# Patient Record
Sex: FEMALE | Race: WHITE | Employment: OTHER | URBAN - METROPOLITAN AREA
[De-identification: names, ages, dates, MRNs, and addresses within clinical notes are randomized per-mention and may not be internally consistent; named-entity substitution may affect disease eponyms.]

---

## 2019-05-10 ENCOUNTER — HOSPITAL ENCOUNTER (OUTPATIENT)
Age: 74
Setting detail: SPECIMEN
Discharge: HOME | End: 2019-05-10
Payer: MEDICARE

## 2019-05-10 LAB
-: ABNORMAL
ABSOLUTE EOS #: 0.26 K/UL (ref 0–0.44)
ABSOLUTE IMMATURE GRANULOCYTE: <0.03 K/UL (ref 0–0.3)
ABSOLUTE LYMPH #: 1.91 K/UL (ref 1.1–3.7)
ABSOLUTE MONO #: 0.49 K/UL (ref 0.1–1.2)
ALBUMIN SERPL-MCNC: 4.5 G/DL (ref 3.5–5.2)
ALBUMIN/GLOBULIN RATIO: 2.1 (ref 1–2.5)
ALP BLD-CCNC: 86 U/L (ref 35–104)
ALT SERPL-CCNC: 13 U/L (ref 5–33)
AMORPHOUS: ABNORMAL
ANION GAP SERPL CALCULATED.3IONS-SCNC: 16 MMOL/L (ref 9–17)
AST SERPL-CCNC: 25 U/L
BACTERIA: ABNORMAL
BASOPHILS # BLD: 1 % (ref 0–2)
BASOPHILS ABSOLUTE: 0.05 K/UL (ref 0–0.2)
BILIRUB SERPL-MCNC: 0.71 MG/DL (ref 0.3–1.2)
BILIRUBIN URINE: NEGATIVE
BUN BLDV-MCNC: 11 MG/DL (ref 8–23)
BUN/CREAT BLD: ABNORMAL (ref 9–20)
CALCIUM SERPL-MCNC: 9.8 MG/DL (ref 8.6–10.4)
CASTS UA: ABNORMAL /LPF (ref 0–8)
CHLORIDE BLD-SCNC: 97 MMOL/L (ref 98–107)
CHOLESTEROL/HDL RATIO: 3.1
CHOLESTEROL: 181 MG/DL
CO2: 25 MMOL/L (ref 20–31)
COLOR: YELLOW
COMMENT UA: ABNORMAL
CREAT SERPL-MCNC: 0.6 MG/DL (ref 0.5–0.9)
CRYSTALS, UA: ABNORMAL /HPF
DIFFERENTIAL TYPE: NORMAL
EOSINOPHILS RELATIVE PERCENT: 4 % (ref 1–4)
EPITHELIAL CELLS UA: ABNORMAL /HPF (ref 0–5)
GFR AFRICAN AMERICAN: >60 ML/MIN
GFR NON-AFRICAN AMERICAN: >60 ML/MIN
GFR SERPL CREATININE-BSD FRML MDRD: ABNORMAL ML/MIN/{1.73_M2}
GFR SERPL CREATININE-BSD FRML MDRD: ABNORMAL ML/MIN/{1.73_M2}
GLUCOSE BLD-MCNC: 90 MG/DL (ref 70–99)
GLUCOSE URINE: NEGATIVE
HCT VFR BLD CALC: 43.6 % (ref 36.3–47.1)
HDLC SERPL-MCNC: 59 MG/DL
HEMOGLOBIN: 14 G/DL (ref 11.9–15.1)
IMMATURE GRANULOCYTES: 0 %
KETONES, URINE: NEGATIVE
LDL CHOLESTEROL: 90 MG/DL (ref 0–130)
LEUKOCYTE ESTERASE, URINE: ABNORMAL
LYMPHOCYTES # BLD: 30 % (ref 24–43)
MCH RBC QN AUTO: 30.1 PG (ref 25.2–33.5)
MCHC RBC AUTO-ENTMCNC: 32.1 G/DL (ref 28.4–34.8)
MCV RBC AUTO: 93.8 FL (ref 82.6–102.9)
MONOCYTES # BLD: 8 % (ref 3–12)
MUCUS: ABNORMAL
NITRITE, URINE: NEGATIVE
NRBC AUTOMATED: 0 PER 100 WBC
OTHER OBSERVATIONS UA: ABNORMAL
PDW BLD-RTO: 13.6 % (ref 11.8–14.4)
PH UA: 6 (ref 5–8)
PLATELET # BLD: NORMAL K/UL (ref 138–453)
PLATELET ESTIMATE: NORMAL
PLATELET, FLUORESCENCE: NORMAL K/UL (ref 138–453)
PMV BLD AUTO: NORMAL FL (ref 8.1–13.5)
POTASSIUM SERPL-SCNC: 5.1 MMOL/L (ref 3.7–5.3)
PROTEIN UA: NEGATIVE
RBC # BLD: 4.65 M/UL (ref 3.95–5.11)
RBC # BLD: NORMAL 10*6/UL
RBC UA: ABNORMAL /HPF (ref 0–4)
RENAL EPITHELIAL, UA: ABNORMAL /HPF
SEG NEUTROPHILS: 58 % (ref 36–65)
SEGMENTED NEUTROPHILS ABSOLUTE COUNT: 3.69 K/UL (ref 1.5–8.1)
SODIUM BLD-SCNC: 138 MMOL/L (ref 135–144)
SPECIFIC GRAVITY UA: 1.01 (ref 1–1.03)
TOTAL PROTEIN: 6.6 G/DL (ref 6.4–8.3)
TRICHOMONAS: ABNORMAL
TRIGL SERPL-MCNC: 158 MG/DL
TSH SERPL DL<=0.05 MIU/L-ACNC: 3.7 MIU/L (ref 0.3–5)
TURBIDITY: CLEAR
URINE HGB: NEGATIVE
UROBILINOGEN, URINE: NORMAL
VLDLC SERPL CALC-MCNC: ABNORMAL MG/DL (ref 1–30)
WBC # BLD: 6.4 K/UL (ref 3.5–11.3)
WBC # BLD: NORMAL 10*3/UL
WBC UA: ABNORMAL /HPF (ref 0–5)
YEAST: ABNORMAL

## 2019-05-12 LAB
CULTURE: ABNORMAL
Lab: ABNORMAL
SPECIMEN DESCRIPTION: ABNORMAL

## 2019-06-12 RX ORDER — PYRIDOXINE HCL (VITAMIN B6) 100 MG
50 TABLET ORAL DAILY
COMMUNITY

## 2019-06-12 RX ORDER — ACETAMINOPHEN 325 MG/1
650 TABLET ORAL EVERY 4 HOURS PRN
COMMUNITY

## 2019-06-12 RX ORDER — RIBOFLAVIN (VITAMIN B2) 100 MG
100 TABLET ORAL DAILY
COMMUNITY

## 2019-06-12 RX ORDER — SENNA PLUS 8.6 MG/1
1 TABLET ORAL DAILY
COMMUNITY

## 2019-06-12 RX ORDER — DOCUSATE SODIUM 100 MG/1
100 CAPSULE, LIQUID FILLED ORAL 2 TIMES DAILY
COMMUNITY

## 2019-06-12 RX ORDER — GABAPENTIN 300 MG/1
300 CAPSULE ORAL 2 TIMES DAILY
COMMUNITY

## 2019-06-12 RX ORDER — FLUTICASONE FUROATE AND VILANTEROL 100; 25 UG/1; UG/1
POWDER RESPIRATORY (INHALATION) DAILY
COMMUNITY

## 2019-06-12 RX ORDER — DULOXETIN HYDROCHLORIDE 60 MG/1
60 CAPSULE, DELAYED RELEASE ORAL 2 TIMES DAILY
COMMUNITY

## 2019-06-12 RX ORDER — ALBUTEROL SULFATE 2.5 MG/3ML
2.5 SOLUTION RESPIRATORY (INHALATION) EVERY 6 HOURS PRN
COMMUNITY

## 2019-06-12 RX ORDER — FUROSEMIDE 20 MG/1
20 TABLET ORAL DAILY
COMMUNITY
End: 2019-06-18

## 2019-06-12 RX ORDER — SULFAMETHOXAZOLE AND TRIMETHOPRIM 800; 160 MG/1; MG/1
1 TABLET ORAL 2 TIMES DAILY
COMMUNITY
End: 2019-10-10 | Stop reason: ALTCHOICE

## 2019-06-12 RX ORDER — DILTIAZEM HYDROCHLORIDE 120 MG/1
120 CAPSULE, EXTENDED RELEASE ORAL DAILY
COMMUNITY

## 2019-06-12 RX ORDER — NICOTINE POLACRILEX 2 MG
GUM BUCCAL 3 TIMES DAILY
COMMUNITY
End: 2019-10-10 | Stop reason: ALTCHOICE

## 2019-06-17 RX ORDER — LISINOPRIL 5 MG/1
5 TABLET ORAL DAILY
COMMUNITY
End: 2019-06-18

## 2019-06-17 RX ORDER — SOLIFENACIN SUCCINATE 10 MG/1
10 TABLET, FILM COATED ORAL DAILY
COMMUNITY

## 2019-06-17 RX ORDER — MECLIZINE HYDROCHLORIDE 25 MG/1
25 TABLET ORAL 3 TIMES DAILY PRN
COMMUNITY
End: 2019-10-10 | Stop reason: ALTCHOICE

## 2019-06-17 RX ORDER — ATORVASTATIN CALCIUM 20 MG/1
20 TABLET, FILM COATED ORAL DAILY
COMMUNITY

## 2019-06-17 RX ORDER — MULTIVIT-MIN/FERROUS GLUCONATE 9 MG/15 ML
15 LIQUID (ML) ORAL DAILY
COMMUNITY

## 2019-06-17 RX ORDER — LORATADINE 10 MG/1
10 TABLET ORAL DAILY
COMMUNITY
End: 2019-10-10 | Stop reason: ALTCHOICE

## 2019-06-17 RX ORDER — LEVOTHYROXINE SODIUM 0.05 MG/1
50 TABLET ORAL DAILY
COMMUNITY

## 2019-06-17 RX ORDER — M-VIT,TX,IRON,MINS/CALC/FOLIC 27MG-0.4MG
1 TABLET ORAL DAILY
COMMUNITY
End: 2021-05-26 | Stop reason: SDUPTHER

## 2019-06-17 RX ORDER — LETROZOLE 2.5 MG/1
2.5 TABLET, FILM COATED ORAL DAILY
COMMUNITY
End: 2019-10-10 | Stop reason: ALTCHOICE

## 2019-06-17 RX ORDER — OMEPRAZOLE 20 MG/1
40 CAPSULE, DELAYED RELEASE ORAL DAILY
COMMUNITY

## 2019-06-17 RX ORDER — UBIDECARENONE 75 MG
50 CAPSULE ORAL DAILY
COMMUNITY

## 2019-06-17 RX ORDER — METOPROLOL SUCCINATE 25 MG/1
12.5 TABLET, EXTENDED RELEASE ORAL DAILY
COMMUNITY

## 2019-06-17 RX ORDER — NITROGLYCERIN 0.4 MG/1
0.4 TABLET SUBLINGUAL EVERY 5 MIN PRN
COMMUNITY

## 2019-06-17 RX ORDER — FLUOXETINE HYDROCHLORIDE 20 MG/1
40 CAPSULE ORAL DAILY
COMMUNITY

## 2019-06-17 RX ORDER — POLYETHYLENE GLYCOL 3350 17 G/17G
17 POWDER, FOR SOLUTION ORAL 2 TIMES DAILY
COMMUNITY

## 2019-06-18 ENCOUNTER — OFFICE VISIT (OUTPATIENT)
Dept: NEUROLOGY | Age: 74
End: 2019-06-18
Payer: MEDICARE

## 2019-06-18 VITALS
WEIGHT: 134 LBS | HEIGHT: 59 IN | SYSTOLIC BLOOD PRESSURE: 148 MMHG | DIASTOLIC BLOOD PRESSURE: 90 MMHG | HEART RATE: 74 BPM | BODY MASS INDEX: 27.01 KG/M2

## 2019-06-18 DIAGNOSIS — M19.90 OSTEOARTHRITIS, UNSPECIFIED OSTEOARTHRITIS TYPE, UNSPECIFIED SITE: ICD-10-CM

## 2019-06-18 DIAGNOSIS — R41.3 MEMORY PROBLEM: ICD-10-CM

## 2019-06-18 DIAGNOSIS — F32.A ANXIETY AND DEPRESSION: ICD-10-CM

## 2019-06-18 DIAGNOSIS — R53.82 CHRONIC FATIGUE: ICD-10-CM

## 2019-06-18 DIAGNOSIS — G63 POLYNEUROPATHY ASSOCIATED WITH UNDERLYING DISEASE (HCC): ICD-10-CM

## 2019-06-18 DIAGNOSIS — F41.9 ANXIETY AND DEPRESSION: ICD-10-CM

## 2019-06-18 DIAGNOSIS — G93.89 OTHER SPECIFIED DISORDERS OF BRAIN: ICD-10-CM

## 2019-06-18 DIAGNOSIS — G47.9 DIFFICULTY SLEEPING: ICD-10-CM

## 2019-06-18 DIAGNOSIS — R26.9 GAIT DIFFICULTY: ICD-10-CM

## 2019-06-18 DIAGNOSIS — R42 VERTIGO: ICD-10-CM

## 2019-06-18 DIAGNOSIS — G44.229 CHRONIC TENSION-TYPE HEADACHE, NOT INTRACTABLE: ICD-10-CM

## 2019-06-18 DIAGNOSIS — F34.1 DYSTHYMIC DISORDER: ICD-10-CM

## 2019-06-18 DIAGNOSIS — R25.8 BRADYKINESIA: ICD-10-CM

## 2019-06-18 DIAGNOSIS — R29.6 EXCESSIVE FALLING: ICD-10-CM

## 2019-06-18 DIAGNOSIS — S09.90XA MILD CLOSED HEAD INJURY, INITIAL ENCOUNTER: ICD-10-CM

## 2019-06-18 DIAGNOSIS — E78.5 HYPERLIPIDEMIA, UNSPECIFIED HYPERLIPIDEMIA TYPE: ICD-10-CM

## 2019-06-18 DIAGNOSIS — R42 DIZZINESS: Primary | ICD-10-CM

## 2019-06-18 DIAGNOSIS — K21.9 GASTROESOPHAGEAL REFLUX DISEASE WITHOUT ESOPHAGITIS: ICD-10-CM

## 2019-06-18 DIAGNOSIS — R26.89 BALANCE PROBLEM: ICD-10-CM

## 2019-06-18 DIAGNOSIS — Z91.81 H/O FALLING: ICD-10-CM

## 2019-06-18 DIAGNOSIS — J44.9 CHRONIC OBSTRUCTIVE PULMONARY DISEASE, UNSPECIFIED COPD TYPE (HCC): ICD-10-CM

## 2019-06-18 DIAGNOSIS — G20 PD (PARKINSON'S DISEASE) (HCC): ICD-10-CM

## 2019-06-18 DIAGNOSIS — E03.9 ACQUIRED HYPOTHYROIDISM: ICD-10-CM

## 2019-06-18 DIAGNOSIS — R26.9 ABNORMAL GAIT: ICD-10-CM

## 2019-06-18 PROBLEM — G20.A1 PD (PARKINSON'S DISEASE): Status: ACTIVE | Noted: 2019-06-18

## 2019-06-18 PROCEDURE — 1090F PRES/ABSN URINE INCON ASSESS: CPT | Performed by: PSYCHIATRY & NEUROLOGY

## 2019-06-18 PROCEDURE — 99205 OFFICE O/P NEW HI 60 MIN: CPT | Performed by: PSYCHIATRY & NEUROLOGY

## 2019-06-18 PROCEDURE — 3023F SPIROM DOC REV: CPT | Performed by: PSYCHIATRY & NEUROLOGY

## 2019-06-18 PROCEDURE — G8419 CALC BMI OUT NRM PARAM NOF/U: HCPCS | Performed by: PSYCHIATRY & NEUROLOGY

## 2019-06-18 PROCEDURE — G8926 SPIRO NO PERF OR DOC: HCPCS | Performed by: PSYCHIATRY & NEUROLOGY

## 2019-06-18 PROCEDURE — G8427 DOCREV CUR MEDS BY ELIG CLIN: HCPCS | Performed by: PSYCHIATRY & NEUROLOGY

## 2019-06-18 RX ORDER — HYDROCODONE BITARTRATE AND ACETAMINOPHEN 5; 325 MG/1; MG/1
1 TABLET ORAL EVERY 6 HOURS PRN
COMMUNITY
End: 2019-10-10 | Stop reason: ALTCHOICE

## 2019-06-18 ASSESSMENT — ENCOUNTER SYMPTOMS
PHOTOPHOBIA: 0
BLOOD IN STOOL: 0
EYE PAIN: 0
WHEEZING: 0
SORE THROAT: 0
ABDOMINAL DISTENTION: 0
CHANGE IN BOWEL HABIT: 0
CHOKING: 0
CHEST TIGHTNESS: 0
SINUS PRESSURE: 0
ABDOMINAL PAIN: 0
VOMITING: 0
DIARRHEA: 0
EYE DISCHARGE: 0
BOWEL INCONTINENCE: 0
VOICE CHANGE: 0
NAUSEA: 0
TROUBLE SWALLOWING: 0
VISUAL CHANGE: 0
EYE ITCHING: 0
EYE REDNESS: 0
SWOLLEN GLANDS: 0
COUGH: 0
SHORTNESS OF BREATH: 0
BACK PAIN: 1
FACIAL SWELLING: 0
COLOR CHANGE: 0
APNEA: 0
CONSTIPATION: 0

## 2019-06-18 NOTE — PATIENT INSTRUCTIONS
* FALL   PRECAUTIONS. *  USE   WALKING  ASSISTANCE  DEVICES     QUAD  CANE  /   WALKER        *   ADEQUATE   FLUID  INTAKE   AND  ELECTROLYTE  BALANCE           * KEEP  DAIRY  OF   THE  NEUROLOGICAL  SYMPTOMS          *  TO  MAINTAIN  REGULAR  SLEEP  WAKE  CYCLES. *   TO  HAVE  ADEQUATE  REST  AND   SLEEP    HOURS.        *    AVOID  USAGE OF   TOBACCO,  EXCESSIVE  ALCOHOL                AND   ILLEGAL   SUBSTANCES,  IF  ANY          *  Maintain   Healthy  Life Style    With   Periodic  Monitoring  Of      Any  Medical  Conditions  Including   Elevated  Blood  Pressure,  Lipid  Profile,     Blood  Sugar levels  And   Heart  Disease. *   Period   Screening  For  Cancers  Involving  Breast,  Colon,   lungs  And  Other  Organs  As  Applicable,  In consultation   With  Your  Primary Care Providers. *  If   There is  Any  Significant  Worsening   Of  Current  Symptoms  And  Or  If    Any additional  New  Neurological  Symptoms                 Or  Significant  Concerns   Should  Call  911 or  Go  To  Emergency  Department  For  Further  Immediate  Evaluation.

## 2019-07-03 ENCOUNTER — HOSPITAL ENCOUNTER (OUTPATIENT)
Dept: INTERVENTIONAL RADIOLOGY/VASCULAR | Age: 74
Discharge: HOME | End: 2019-07-05
Payer: MEDICARE

## 2019-07-03 DIAGNOSIS — G44.229 CHRONIC TENSION-TYPE HEADACHE, NOT INTRACTABLE: ICD-10-CM

## 2019-07-03 DIAGNOSIS — R41.3 MEMORY PROBLEM: ICD-10-CM

## 2019-07-03 DIAGNOSIS — R42 VERTIGO: ICD-10-CM

## 2019-07-03 DIAGNOSIS — G47.9 DIFFICULTY SLEEPING: ICD-10-CM

## 2019-07-03 DIAGNOSIS — R29.6 EXCESSIVE FALLING: ICD-10-CM

## 2019-07-03 DIAGNOSIS — G63 POLYNEUROPATHY ASSOCIATED WITH UNDERLYING DISEASE (HCC): ICD-10-CM

## 2019-07-03 DIAGNOSIS — S09.90XA MILD CLOSED HEAD INJURY, INITIAL ENCOUNTER: ICD-10-CM

## 2019-07-03 DIAGNOSIS — Z91.81 H/O FALLING: ICD-10-CM

## 2019-07-03 DIAGNOSIS — R42 DIZZINESS: ICD-10-CM

## 2019-07-03 DIAGNOSIS — G20 PD (PARKINSON'S DISEASE) (HCC): ICD-10-CM

## 2019-07-03 PROCEDURE — 93880 EXTRACRANIAL BILAT STUDY: CPT

## 2019-07-15 DIAGNOSIS — G20 PD (PARKINSON'S DISEASE) (HCC): Primary | ICD-10-CM

## 2019-08-07 ENCOUNTER — HOSPITAL ENCOUNTER (OUTPATIENT)
Dept: NEUROLOGY | Age: 74
Discharge: HOME | End: 2019-08-07
Payer: MEDICARE

## 2019-08-07 DIAGNOSIS — R29.6 EXCESSIVE FALLING: ICD-10-CM

## 2019-08-07 DIAGNOSIS — G47.9 DIFFICULTY SLEEPING: ICD-10-CM

## 2019-08-07 DIAGNOSIS — R42 DIZZINESS: ICD-10-CM

## 2019-08-07 DIAGNOSIS — R41.3 MEMORY PROBLEM: ICD-10-CM

## 2019-08-07 DIAGNOSIS — G63 POLYNEUROPATHY ASSOCIATED WITH UNDERLYING DISEASE (HCC): ICD-10-CM

## 2019-08-07 DIAGNOSIS — Z91.81 H/O FALLING: ICD-10-CM

## 2019-08-07 DIAGNOSIS — G44.229 CHRONIC TENSION-TYPE HEADACHE, NOT INTRACTABLE: ICD-10-CM

## 2019-08-07 DIAGNOSIS — G20 PD (PARKINSON'S DISEASE) (HCC): ICD-10-CM

## 2019-08-07 DIAGNOSIS — S09.90XA MILD CLOSED HEAD INJURY, INITIAL ENCOUNTER: ICD-10-CM

## 2019-08-07 DIAGNOSIS — R42 VERTIGO: ICD-10-CM

## 2019-08-08 ENCOUNTER — HOSPITAL ENCOUNTER (OUTPATIENT)
Dept: NEUROLOGY | Age: 74
Discharge: HOME | End: 2019-08-08
Payer: MEDICARE

## 2019-08-08 ENCOUNTER — TELEPHONE (OUTPATIENT)
Dept: NEUROLOGY | Age: 74
End: 2019-08-08

## 2019-08-08 DIAGNOSIS — G63 POLYNEUROPATHY ASSOCIATED WITH UNDERLYING DISEASE (HCC): ICD-10-CM

## 2019-08-08 DIAGNOSIS — R41.3 MEMORY PROBLEM: ICD-10-CM

## 2019-08-08 DIAGNOSIS — Z91.81 H/O FALLING: ICD-10-CM

## 2019-08-08 DIAGNOSIS — R42 VERTIGO: ICD-10-CM

## 2019-08-08 DIAGNOSIS — G44.229 CHRONIC TENSION-TYPE HEADACHE, NOT INTRACTABLE: ICD-10-CM

## 2019-08-08 DIAGNOSIS — R42 DIZZINESS: ICD-10-CM

## 2019-08-08 DIAGNOSIS — G47.9 DIFFICULTY SLEEPING: ICD-10-CM

## 2019-08-08 DIAGNOSIS — R29.6 EXCESSIVE FALLING: ICD-10-CM

## 2019-08-08 DIAGNOSIS — R42 VERTIGO: Primary | ICD-10-CM

## 2019-08-08 DIAGNOSIS — S09.90XA MILD CLOSED HEAD INJURY, INITIAL ENCOUNTER: ICD-10-CM

## 2019-08-08 DIAGNOSIS — G20 PD (PARKINSON'S DISEASE) (HCC): ICD-10-CM

## 2019-08-08 PROCEDURE — 95957 EEG DIGITAL ANALYSIS: CPT

## 2019-08-08 PROCEDURE — 95813 EEG EXTND MNTR 61-119 MIN: CPT

## 2019-08-09 ENCOUNTER — HOSPITAL ENCOUNTER (OUTPATIENT)
Dept: NEUROLOGY | Age: 74
Discharge: HOME | End: 2019-08-09
Payer: MEDICARE

## 2019-08-09 DIAGNOSIS — R26.9 GAIT DIFFICULTY: ICD-10-CM

## 2019-08-09 DIAGNOSIS — R26.9 ABNORMAL GAIT: ICD-10-CM

## 2019-08-09 DIAGNOSIS — G20 PD (PARKINSON'S DISEASE) (HCC): ICD-10-CM

## 2019-08-09 DIAGNOSIS — R41.3 MEMORY PROBLEM: ICD-10-CM

## 2019-08-09 DIAGNOSIS — R26.89 BALANCE PROBLEM: ICD-10-CM

## 2019-08-09 DIAGNOSIS — G44.229 CHRONIC TENSION-TYPE HEADACHE, NOT INTRACTABLE: ICD-10-CM

## 2019-08-09 DIAGNOSIS — G63 POLYNEUROPATHY ASSOCIATED WITH UNDERLYING DISEASE (HCC): ICD-10-CM

## 2019-08-09 DIAGNOSIS — R29.6 EXCESSIVE FALLING: ICD-10-CM

## 2019-08-09 DIAGNOSIS — G47.9 DIFFICULTY SLEEPING: ICD-10-CM

## 2019-08-09 DIAGNOSIS — R42 DIZZINESS: ICD-10-CM

## 2019-08-09 DIAGNOSIS — S09.90XA MILD CLOSED HEAD INJURY, INITIAL ENCOUNTER: ICD-10-CM

## 2019-08-09 DIAGNOSIS — G93.89 OTHER SPECIFIED DISORDERS OF BRAIN: ICD-10-CM

## 2019-08-09 DIAGNOSIS — Z91.81 H/O FALLING: ICD-10-CM

## 2019-08-09 DIAGNOSIS — R42 VERTIGO: ICD-10-CM

## 2019-08-09 PROCEDURE — 93892 TCD EMBOLI DETECT W/O INJ: CPT

## 2019-08-09 PROCEDURE — 93886 INTRACRANIAL COMPLETE STUDY: CPT

## 2019-08-09 NOTE — PROCEDURES
[facility] [address]                 [address] [place] [id number]                       ELECTROENCEPHALOGRAM (EEG) REPORT      PATIENT NAME: Leland Garza                     :        1945  MED REC NO:   [id number]                             ROOM:  ACCOUNT NO:   [de-identified]                           ADMIT DATE: 2019  PROVIDER:     Ger Santiago MD    DATE OF STUDY:  2019    REFERRING PROVIDER:  Ger Santiago MD - Neurology      TECHNIQUE:  23 channels of EEG, 2 channels of EOG, 2 channel of EKG and  1 channel ground and 1 channel reference were recorded with Send the Trend  Software 32 Channel System utilizing the International 10/20 System  Protocol. Newton detection and seizure analysis protocols were utilized and the  study was reviewed using the comprehensive EEG monitoring. Newton detection trending allows to see at a glance timing of detected  seizure in the context of other changes in the EEG. Newton detection  analysis automatically notes the most types of electrode artifacts  making trends easier to review and more representative of cerebral  activity. Newton detection analysis also provides collection of trends  for monitoring and review including seizure probability, rhythmic  spectrogram left and right hemispheres, peak envelope, amplitude,  relative symmetry and suppression ratio. This is an extended EEG recorded for more than one hour. CLINICAL DATA:      The patient is 69years old with a history of mild head  injury, falling, dizziness, chronic fatigue, memory problem, Parkinson's  disease. The purpose of the study is to evaluate for associated seizure activity. MEDICATIONS:  Sinemet, Synthroid, Prozac, gabapentin. BACKGROUND ACTIVITY:      While the patient was awake, the background  activity consisted of poorly regulated 7 to 8 Hz waveforms seen over  both cerebral hemispheres.     Intermittent slow waves

## 2019-08-09 NOTE — PROGRESS NOTES
TCD Completed with [facility]. PCP: OPAL Cartwright CNP    Ordering: Taurus Williamson Neurologist    Interpreting Physician: Taurus Olson.  Azalea Williamson    Electronically signed by Yohana Krueger RN on 8/9/2019 at 9:02 AM

## 2019-08-14 ENCOUNTER — HOSPITAL ENCOUNTER (OUTPATIENT)
Dept: NEUROLOGY | Age: 74
Discharge: HOME | End: 2019-08-14
Payer: MEDICARE

## 2019-08-14 DIAGNOSIS — G20 PD (PARKINSON'S DISEASE) (HCC): ICD-10-CM

## 2019-08-14 DIAGNOSIS — R42 VERTIGO: ICD-10-CM

## 2019-08-14 DIAGNOSIS — R29.6 EXCESSIVE FALLING: ICD-10-CM

## 2019-08-14 DIAGNOSIS — G47.9 DIFFICULTY SLEEPING: ICD-10-CM

## 2019-08-14 DIAGNOSIS — R42 DIZZINESS: ICD-10-CM

## 2019-08-14 DIAGNOSIS — R41.3 MEMORY PROBLEM: ICD-10-CM

## 2019-08-14 DIAGNOSIS — G44.229 CHRONIC TENSION-TYPE HEADACHE, NOT INTRACTABLE: ICD-10-CM

## 2019-08-14 DIAGNOSIS — Z91.81 H/O FALLING: ICD-10-CM

## 2019-08-14 DIAGNOSIS — G63 POLYNEUROPATHY ASSOCIATED WITH UNDERLYING DISEASE (HCC): ICD-10-CM

## 2019-08-14 DIAGNOSIS — S09.90XA MILD CLOSED HEAD INJURY, INITIAL ENCOUNTER: ICD-10-CM

## 2019-08-14 PROCEDURE — 95886 MUSC TEST DONE W/N TEST COMP: CPT

## 2019-08-14 PROCEDURE — 95912 NRV CNDJ TEST 11-12 STUDIES: CPT

## 2019-10-02 ENCOUNTER — HOSPITAL ENCOUNTER (OUTPATIENT)
Dept: NEUROLOGY | Age: 74
Discharge: HOME | End: 2019-10-02
Payer: MEDICARE

## 2019-10-02 PROCEDURE — 95911 NRV CNDJ TEST 9-10 STUDIES: CPT

## 2019-10-02 PROCEDURE — 95886 MUSC TEST DONE W/N TEST COMP: CPT

## 2019-10-10 ENCOUNTER — OFFICE VISIT (OUTPATIENT)
Dept: NEUROLOGY | Age: 74
End: 2019-10-10
Payer: MEDICARE

## 2019-10-10 VITALS
HEIGHT: 59 IN | DIASTOLIC BLOOD PRESSURE: 74 MMHG | SYSTOLIC BLOOD PRESSURE: 126 MMHG | RESPIRATION RATE: 18 BRPM | WEIGHT: 132.6 LBS | HEART RATE: 68 BPM | BODY MASS INDEX: 26.73 KG/M2

## 2019-10-10 DIAGNOSIS — G44.229 CHRONIC TENSION-TYPE HEADACHE, NOT INTRACTABLE: ICD-10-CM

## 2019-10-10 DIAGNOSIS — R42 DIZZINESS: ICD-10-CM

## 2019-10-10 DIAGNOSIS — J44.9 CHRONIC OBSTRUCTIVE PULMONARY DISEASE, UNSPECIFIED COPD TYPE (HCC): ICD-10-CM

## 2019-10-10 DIAGNOSIS — R26.9 GAIT DIFFICULTY: ICD-10-CM

## 2019-10-10 DIAGNOSIS — G56.23 ULNAR NEUROPATHY OF BOTH UPPER EXTREMITIES: ICD-10-CM

## 2019-10-10 DIAGNOSIS — R29.6 EXCESSIVE FALLING: ICD-10-CM

## 2019-10-10 DIAGNOSIS — K21.9 GASTROESOPHAGEAL REFLUX DISEASE WITHOUT ESOPHAGITIS: ICD-10-CM

## 2019-10-10 DIAGNOSIS — G57.30 PERONEAL NEUROPATHY, UNSPECIFIED LATERALITY: ICD-10-CM

## 2019-10-10 DIAGNOSIS — I67.82 CHRONIC CEREBRAL ISCHEMIA: ICD-10-CM

## 2019-10-10 DIAGNOSIS — G89.29 CHRONIC BILATERAL LOW BACK PAIN WITHOUT SCIATICA: ICD-10-CM

## 2019-10-10 DIAGNOSIS — R53.82 CHRONIC FATIGUE: ICD-10-CM

## 2019-10-10 DIAGNOSIS — S09.90XS MILD CLOSED HEAD INJURY, SEQUELA: ICD-10-CM

## 2019-10-10 DIAGNOSIS — R42 VERTIGO: ICD-10-CM

## 2019-10-10 DIAGNOSIS — R26.9 ABNORMAL GAIT: ICD-10-CM

## 2019-10-10 DIAGNOSIS — I65.29 CAROTID STENOSIS, ASYMPTOMATIC, UNSPECIFIED LATERALITY: ICD-10-CM

## 2019-10-10 DIAGNOSIS — E03.9 ACQUIRED HYPOTHYROIDISM: ICD-10-CM

## 2019-10-10 DIAGNOSIS — G20 PD (PARKINSON'S DISEASE) (HCC): Primary | ICD-10-CM

## 2019-10-10 DIAGNOSIS — G63 POLYNEUROPATHY ASSOCIATED WITH UNDERLYING DISEASE (HCC): ICD-10-CM

## 2019-10-10 DIAGNOSIS — Z91.81 H/O FALLING: ICD-10-CM

## 2019-10-10 DIAGNOSIS — G72.9 MYOPATHY: ICD-10-CM

## 2019-10-10 DIAGNOSIS — G56.03 BILATERAL CARPAL TUNNEL SYNDROME: ICD-10-CM

## 2019-10-10 DIAGNOSIS — G45.0 VBI (VERTEBROBASILAR INSUFFICIENCY): ICD-10-CM

## 2019-10-10 DIAGNOSIS — F32.A ANXIETY AND DEPRESSION: ICD-10-CM

## 2019-10-10 DIAGNOSIS — R41.3 MEMORY PROBLEM: ICD-10-CM

## 2019-10-10 DIAGNOSIS — F41.9 ANXIETY AND DEPRESSION: ICD-10-CM

## 2019-10-10 DIAGNOSIS — G47.9 DIFFICULTY SLEEPING: ICD-10-CM

## 2019-10-10 DIAGNOSIS — R26.89 BALANCE PROBLEM: ICD-10-CM

## 2019-10-10 DIAGNOSIS — M54.50 CHRONIC BILATERAL LOW BACK PAIN WITHOUT SCIATICA: ICD-10-CM

## 2019-10-10 DIAGNOSIS — R25.8 BRADYKINESIA: ICD-10-CM

## 2019-10-10 PROCEDURE — 1036F TOBACCO NON-USER: CPT | Performed by: PSYCHIATRY & NEUROLOGY

## 2019-10-10 PROCEDURE — G8598 ASA/ANTIPLAT THER USED: HCPCS | Performed by: PSYCHIATRY & NEUROLOGY

## 2019-10-10 PROCEDURE — G8427 DOCREV CUR MEDS BY ELIG CLIN: HCPCS | Performed by: PSYCHIATRY & NEUROLOGY

## 2019-10-10 PROCEDURE — G8400 PT W/DXA NO RESULTS DOC: HCPCS | Performed by: PSYCHIATRY & NEUROLOGY

## 2019-10-10 PROCEDURE — 3023F SPIROM DOC REV: CPT | Performed by: PSYCHIATRY & NEUROLOGY

## 2019-10-10 PROCEDURE — 1123F ACP DISCUSS/DSCN MKR DOCD: CPT | Performed by: PSYCHIATRY & NEUROLOGY

## 2019-10-10 PROCEDURE — 3017F COLORECTAL CA SCREEN DOC REV: CPT | Performed by: PSYCHIATRY & NEUROLOGY

## 2019-10-10 PROCEDURE — 4040F PNEUMOC VAC/ADMIN/RCVD: CPT | Performed by: PSYCHIATRY & NEUROLOGY

## 2019-10-10 PROCEDURE — G8926 SPIRO NO PERF OR DOC: HCPCS | Performed by: PSYCHIATRY & NEUROLOGY

## 2019-10-10 PROCEDURE — 99215 OFFICE O/P EST HI 40 MIN: CPT | Performed by: PSYCHIATRY & NEUROLOGY

## 2019-10-10 PROCEDURE — 1090F PRES/ABSN URINE INCON ASSESS: CPT | Performed by: PSYCHIATRY & NEUROLOGY

## 2019-10-10 PROCEDURE — G8419 CALC BMI OUT NRM PARAM NOF/U: HCPCS | Performed by: PSYCHIATRY & NEUROLOGY

## 2019-10-10 PROCEDURE — L3908 WHO COCK-UP NONMOLDE PRE OTS: HCPCS | Performed by: PSYCHIATRY & NEUROLOGY

## 2019-10-10 PROCEDURE — G8484 FLU IMMUNIZE NO ADMIN: HCPCS | Performed by: PSYCHIATRY & NEUROLOGY

## 2019-10-10 ASSESSMENT — ENCOUNTER SYMPTOMS
NAUSEA: 0
WHEEZING: 0
BOWEL INCONTINENCE: 0
CHANGE IN BOWEL HABIT: 0
EYE REDNESS: 0
CONSTIPATION: 0
PHOTOPHOBIA: 0
COUGH: 0
SORE THROAT: 0
ABDOMINAL PAIN: 0
EYE ITCHING: 0
SHORTNESS OF BREATH: 0
BACK PAIN: 1
DIARRHEA: 0
COLOR CHANGE: 0
BLOOD IN STOOL: 0
VISUAL CHANGE: 0
CHOKING: 0
VOICE CHANGE: 0
ABDOMINAL DISTENTION: 0
EYE DISCHARGE: 0
TROUBLE SWALLOWING: 0
SINUS PRESSURE: 0
EYE PAIN: 0
VOMITING: 0
CHEST TIGHTNESS: 0
FACIAL SWELLING: 0
SWOLLEN GLANDS: 0
APNEA: 0

## 2019-12-09 ENCOUNTER — TELEPHONE (OUTPATIENT)
Dept: NEUROLOGY | Age: 74
End: 2019-12-09

## 2020-01-14 ENCOUNTER — OFFICE VISIT (OUTPATIENT)
Dept: NEUROLOGY | Age: 75
End: 2020-01-14
Payer: MEDICARE

## 2020-01-14 VITALS
HEART RATE: 50 BPM | HEIGHT: 59 IN | DIASTOLIC BLOOD PRESSURE: 68 MMHG | SYSTOLIC BLOOD PRESSURE: 138 MMHG | WEIGHT: 125.8 LBS | BODY MASS INDEX: 25.36 KG/M2 | OXYGEN SATURATION: 95 %

## 2020-01-14 PROCEDURE — 3017F COLORECTAL CA SCREEN DOC REV: CPT | Performed by: PSYCHIATRY & NEUROLOGY

## 2020-01-14 PROCEDURE — G8484 FLU IMMUNIZE NO ADMIN: HCPCS | Performed by: PSYCHIATRY & NEUROLOGY

## 2020-01-14 PROCEDURE — 99214 OFFICE O/P EST MOD 30 MIN: CPT | Performed by: PSYCHIATRY & NEUROLOGY

## 2020-01-14 PROCEDURE — 1123F ACP DISCUSS/DSCN MKR DOCD: CPT | Performed by: PSYCHIATRY & NEUROLOGY

## 2020-01-14 PROCEDURE — G8417 CALC BMI ABV UP PARAM F/U: HCPCS | Performed by: PSYCHIATRY & NEUROLOGY

## 2020-01-14 PROCEDURE — G8427 DOCREV CUR MEDS BY ELIG CLIN: HCPCS | Performed by: PSYCHIATRY & NEUROLOGY

## 2020-01-14 PROCEDURE — G8926 SPIRO NO PERF OR DOC: HCPCS | Performed by: PSYCHIATRY & NEUROLOGY

## 2020-01-14 PROCEDURE — 4040F PNEUMOC VAC/ADMIN/RCVD: CPT | Performed by: PSYCHIATRY & NEUROLOGY

## 2020-01-14 PROCEDURE — 3023F SPIROM DOC REV: CPT | Performed by: PSYCHIATRY & NEUROLOGY

## 2020-01-14 PROCEDURE — 1090F PRES/ABSN URINE INCON ASSESS: CPT | Performed by: PSYCHIATRY & NEUROLOGY

## 2020-01-14 PROCEDURE — G8400 PT W/DXA NO RESULTS DOC: HCPCS | Performed by: PSYCHIATRY & NEUROLOGY

## 2020-01-14 PROCEDURE — 1036F TOBACCO NON-USER: CPT | Performed by: PSYCHIATRY & NEUROLOGY

## 2020-01-14 RX ORDER — CARBIDOPA AND LEVODOPA 50; 200 MG/1; MG/1
1 TABLET, EXTENDED RELEASE ORAL 2 TIMES DAILY
Qty: 60 TABLET | Refills: 3 | Status: SHIPPED | OUTPATIENT
Start: 2020-01-14 | End: 2020-03-25

## 2020-01-14 ASSESSMENT — ENCOUNTER SYMPTOMS
DIARRHEA: 0
VOICE CHANGE: 0
EYE REDNESS: 0
BACK PAIN: 1
EYE PAIN: 0
WHEEZING: 0
PHOTOPHOBIA: 0
APNEA: 0
NAUSEA: 0
EYE ITCHING: 0
CHOKING: 0
CHEST TIGHTNESS: 0
SHORTNESS OF BREATH: 0
CHANGE IN BOWEL HABIT: 0
EYE DISCHARGE: 0
COUGH: 0
COLOR CHANGE: 0
FACIAL SWELLING: 0
BOWEL INCONTINENCE: 0
ABDOMINAL DISTENTION: 0
TROUBLE SWALLOWING: 0
SINUS PRESSURE: 0
CONSTIPATION: 0
ABDOMINAL PAIN: 0
SORE THROAT: 0
BLOOD IN STOOL: 0
VISUAL CHANGE: 0
SWOLLEN GLANDS: 0
VOMITING: 0

## 2020-01-14 NOTE — PROGRESS NOTES
bleeding disorder, a clotting disorder, a CVA, liver disease or seizures. History obtained from  The patient  AND  HER    DAUGHTER        and other  available   medical  records   were  Also  reviewed. The  Duration,  Quality,  Severity,  Location,  Timing,  Context,  Modifying  Factors   Of   The   Chief   Complaint   And  Present  Illness       Was   Reviewed   In   Chronological   [facility]. PATIENT'S  MAIN  CONCERNS INCLUDE :                       1)   CHRONIC     H/O    FALLING      SINCE     2017                              H/O    EXCESSIVE   FALLING    SINCE     MAY    2019                                                       2)    H/O     DIZZY     SPELLS     SINCE    June 2019                                           -     IMPROVED                                        TO  TAKE    ASA  81  MG  PO  DAILY                              3)     NO   H/O   SYNCOPE.                           NO   H/O   SEIZURE   ACTIVITY                                  4)     H/O     ER   VISIT       ON   6/06/ 2019                                          DUE  TO   MILD  HEAD INJURY  DUE  TO  FALL                                     CT    HEAD     AND  CT   CERVICAL  SPINE        SHOWED                                        NO  ACUTE  PATHOLOGY                             5)   MULTIPLE  CO MORBID  MEDICAL  CONDITIONS                                BEING  FOLLOWED  BY     HER  PCP.                             6)   H/O   CHRONIC  GAIT  AND  BALANCE  PROBLEMS                                  PATIENT  USING  CANE /   WALKER       FOR     2   YEARS                            7)    H/O   CHRONIC  FATIGUE    AND                                   GENERALIZED   WEAKNESS                               8)     H/O   CHRONC  MEMORY  PROBLEMS                                        FOR    2   YEARS                          -  MAY  BENEFIT  FROM  MEDICATION   IF SHE IS  WILLING    FOR  THE  SAME. 9)   H/O  CHRONIC    MILD  ANXIETY ,   DEPRESSION                                        FOR     3  YEARS                          PATIENT  TO  FOLLOW  WITH  MENTAL  HEALTH    PROFESSIONALS                      10)     SENSORY  PERIPHERAL  POLYNEUROPATHY                                               11)     BRADYKINESIA,   SPEECH  DIFFICULTY    FALLING                                 PROGRESSIVE     IN  NATURE      FOR    2   YEARS                                -   MILD   TO  MODERATE    PARKINSON'S  DISEASE                        12)     H/O    CHRONIC  TENSION  HEADACHE                                        SINCE    JAN. 2019                       13)           PATIENT    LIVES  BY   HER SELF. PATIENT  DOES  NOT  DRIVE . PATIENT  GETTING      HELP    FROM   VISITING  NURSE                                      AS PER  HER  DAUGHTER                                              14)     PATIENT     STARTED  ON  SINEMET      IN  June 2019                          WITH   SIGNIFICANT  IMPROVEMENT  IN   PARKINSONIAN  FEATURES                              NO   RECURRENCE  OF  FALLING. PATIENT  TOLERATING  THE  SINEMET                                         15)      HAD    NEURO  DIAGNOSTIC  EVALUATIONS   IN    AUG.   2019                                   A)   CAROTID  DOPPLER    -   ASYMPTOMATIC    STENOSIS                                                          ALREADY  ON  ASA  DAILY                                                          -   NEEDS  MONITORING                                   B)    TCD    -   MODERATE  VERTEBROBASILAR  INSUFFICIENCY                                   C)    EEG   -    NO  EPILEPTIFORM  FEATURES                                                                MODERATE   CEREBRAL  DYSFUNCTION 16)     EMG/ NC   STUDIES  IN  AUG. 2019      SHOWED                                   A)  MODERATE  CARPAL  TUNNEL  SYNDROME                                  B)   ULNAR  NEUROPATHY  BILATERALLY                              ADVISED  -      TO   WEAR  WRIST  BRACES                                                     TO  AVOID  PRESSURE  OVER  ULNAR  ASPECTS                                 C)     MODERATE  PERIPHERAL POLYNEUROPATHY                                D)    EARLY  MYOPATHY   BOTH  LOWER  EXTREMITIES                                          ADVISED   -    FALL  PRECAUTIONS                                              -     USE  WALKER  REGULARLY                                             -   PT/  OT                     17)             WITH  REFERENCE  TO PATIENT'S  DAUGHTER'S  QUESTIONS,                                    IN   THE  PAST,    THEY  WERE  INFORMED   THAT      LUMBAR  DISC  SURGERY                                  DOES     NOT       IMPROVE                                       HELP     HER  GAIT  AND  BALANCE PROBLEMS                  18)               EXPECTATIONS,   GOALS   OF   THERAPY   WITH  MEDICATIONS                                      AND  SIDE  EFFECTS  MEDICATION    WERE                                 REVIEWED     AND   DISCUSSED    IN    DETAIL. AS   DISCUSSED    WITH  PATIENT   AND  HER  FAMILY                                    SINEMET  CR      WILL  BE  ADDED   ALSO                                              19)           PATIENT     RECOMMENDED   SUPERVISED      CARE                                       AND     ASSISTED  LIVING                               -    DISCUSSED   WITH  PATIENT  AND  HER  DAUGHTER                                          MULTIPLE  TIMES                                         20 )         VARIOUS  RISK   FACTORS   WERE  REVIEWED   AND   DISCUSSED.                          PATIENT   HAS  MULTIPLE   MEDICAL, NEUROLOGICAL                        AND   MENTAL HEALTH   PROBLEMS . PATIENT'S   MANAGEMENT  IS  CHALLENGING.                                   PRECIPITATING  FACTORS: including  fever/infection, exertion/relaxation, position change, stress, weather change,                        medications/alcohol, time of day/darkness/light  Are  absent                                                              MODIFYING  FACTORS:  fever/infection, exertion/relaxation, position change, stress, weather change,                        medications/alcohol, time of day/darkness/light  Are  absent             Patient   Indicates   The  Presence   And  The  Absence  Of  The  Following    Associated  And   Additional  Neurological    Symptoms:                                Balance  And coordination   problems  present           Gait problems     present            Headaches      absent              Migraines           absent           Memory problemspresent              Confusion        present            Paresthesia   numbness          present           Seizures  And  Starring  Episodes           absent           Syncope,  Near  syncopal episodes         absent           Speech   problems           absent             Swallowing   Problems      absent            Dizziness,  Light headedness           present              Vertigo        present             Generalized   Weakness    present              focal  Weakness     absent             Tremors         absent              Sleep  Problems     present             History  Of   Recent  Head  Injury     present             History  Of   Recent  TIA     absent             History  Of   Recent    Stroke     absent             Neck  Pain   and   Neck muscle  Spasms  present               Radiating  down   And   Weakness           absent            Lower back   Pain  And     Spasms  absent              Radiating    Down   And   Weakness          absent H/OFALLS        present               History  Of   Visual  Symptoms    absent                  Associated   Diplopia       absent                                               Also   Additional   Symptoms   Present    As  Documented    In   The   detailed                  Review  Of  Systems   And    Please   Refer   To    Them for   Additional    Information. Any components  That are either  Unobtainable  Or  Limited  In   HPI, ROS  And/or PFSH   Are                   Due   ToPatient's  Medical  Problems,  Clinical  Condition   and/or lack of                                other    Alternate   resources.           RECORDS   REVIEWED:    historical medical records     INFORMATION   REVIEWED:       MEDICAL   HISTORY,SURGICAL   HISTORY,   MEDICATIONS   LIST,   ALLERGIES AND  DRUG  INTOLERANCES,     FAMILY   HISTORY,  SOCIAL  HISTORY,    PROBLEM  LIST   FOR  PATIENT  CARE   COORDINATION    Past Medical History:   Diagnosis Date    Abnormal gait     Adult BMI 27.0-27.9 kg/sq m     COPD (chronic obstructive pulmonary disease) (HCC)     Difficulty sleeping     Dizziness     Dysthymic disorder     Esophageal reflux     Excessive falling     HTN (hypertension)     Hyperlipidemia     Hypothyroidism     Osteoarthritis     Right hip pain     Vertigo          Past Surgical History:   Procedure Laterality Date    APPENDECTOMY       SECTION      CHOLECYSTECTOMY      FOOT SURGERY           Current Outpatient Medications   Medication Sig Dispense Refill    carbidopa-levodopa (SINEMET)  MG per tablet TAKE ONE TABLET THREE TIMES DAILY WITH FOOD 90 tablet 1    levothyroxine (SYNTHROID) 50 MCG tablet Take 50 mcg by mouth Daily      atorvastatin (LIPITOR) 20 MG tablet Take 20 mg by mouth daily      metoprolol succinate (TOPROL XL) 25 MG extended release tablet Take 25 mg by mouth 2 times daily      polyethylene glycol (GLYCOLAX) packet Take 17 g by mouth 2 times daily      Multiple facility-administered medications for this visit.           Allergies   Allergen Reactions    Fentanyl Other (See Comments)     Hallucinations     Atorvastatin     Pregabalin     Tramadol          Family History   Problem Relation Age of Onset    Stroke Mother          Social History     Socioeconomic History    Marital status: Single     Spouse name: Not on file    Number of children: Not on file    Years of education: Not on file    Highest education level: Not on file   Occupational History    Not on file   Social Needs    Financial resource strain: Not on file    Food insecurity:     Worry: Not on file     Inability: Not on file    Transportation needs:     Medical: Not on file     Non-medical: Not on file   Tobacco Use    Smoking status: Never Smoker    Smokeless tobacco: Never Used   Substance and Sexual Activity    Alcohol use: Not Currently    Drug use: Never    Sexual activity: Not on file   Lifestyle    Physical activity:     Days per week: Not on file     Minutes per session: Not on file    Stress: Not on file   Relationships    Social connections:     Talks on phone: Not on file     Gets together: Not on file     Attends [employer] service: Not on file     Active member of club or organization: Not on file     Attends meetings of clubs or organizations: Not on file     Relationship status: Not on file    Intimate partner violence:     Fear of current or ex partner: Not on file     Emotionally abused: Not on file     Physically abused: Not on file     Forced sexual activity: Not on file   Other Topics Concern    Not on file   Social History Narrative    Not on file       Vitals:    01/14/20 1112   BP: 138/68   Pulse: 50   SpO2: 95%         Wt Readings from Last 3 Encounters:   01/14/20 125 lb 12.8 oz (57.1 kg)   10/10/19 132 lb 9.6 oz (60.1 kg)   06/18/19 134 lb (60.8 kg)         BP Readings from Last 3 Encounters:   01/14/20 138/68   10/10/19 126/74   06/18/19 (!) 148/90 Findings: No erythema or rash. Nails: There is no clubbing. Psychiatric:         Attention and Perception: She is attentive. Mood and Affect: Mood is anxious and depressed. Affect is blunt. Affect is not labile, angry or inappropriate. Speech: She is communicative. Speech is delayed. Speech is not rapid and pressured or tangential.         Behavior: Behavior is slowed. Behavior is not agitated, aggressive, withdrawn, hyperactive or combative. Behavior is cooperative. Thought Content: Thought content is not paranoid or delusional. Thought content does not include homicidal or suicidal ideation. Thought content does not include homicidal or suicidal plan. Cognition and Memory: Memory is impaired. She exhibits impaired recent memory. She does not exhibit impaired remote memory. Judgment: Judgment is not impulsive or inappropriate. Neurologic Exam      NEUROLOGICALEXAMINATION :       A) MENTAL STATUS:                   Alert and  oriented  To time, place  And  Person. No Aphasia. LOW  VOICE                     Able   To  Follow    SIMPLE       Stepcommands   without   Any  Difficulty. No right  To left confusion. SLOW    Speech  And language function. Insight and  Judgment ,Fund  Of  Knowledge   DECREASED                Recent  And  Remote memory  DECREASED                Attention &  Concentration are   DECREASED                                                    B) CRANIAL NERVES :      CN : Visual  Acuity  And  Visual fields  within normal limits               Fundi  Could  Not  Be  Could  Not  Be  Evaluated. 3,4,6 CN : Both  Pupils are   Reactive and  Equal.  Movements  Are  Intact. No  Nystagmus. No  GILLES. No  Afferent  Pupillary  Defect noted.           5 CN :  Normal  Facial sensations and Corneal  Reflexes           7 CN:  Normal  Facial  Symmetry And  Strength. No facial  Weakness. 8 CN :  Hearing  Appears   DECREASED          9, 10 CN: Normal   spontaneous, reflex   palate   movements         11 CN:   Normal  Shoulder  shrug and  strength         12 CN :   Normal  Tongue movements and  Tongue  In midline                        No tongue   Fasciculations or atrophy       C) MOTOR  EXAM:                 Strength  In upper  And  Lower   extremities     4 + 5    BILATERALLY                      Rapid   alternating  And  repetitions  Movements  DECREASED               Muscle  Tone  In upper  And  Lower  Extremities  normal                No rigidity. No  Spasticity. Bradykinesia   PRESENT                  No  Asterixis. Sustention  Tremor , Resting   Tremor   absent                    No   other  Abnormal  Movements noted           D) SENSORY :               Light   touch, pinprick,   position  And  Vibration   DECREASED  IN  GLOVE  AND  STOCKING  MANNER        E) REFLEXES:                   Deep  Tendon  Reflexes   DECRESED                  No  pathological  Reflexes  Bilaterally.                                   F) COORDINATION  AND  GAIT :                                Station and  Gait  SLOW  UNSTEADY                             Romberg 's test   POSITIVE                            Ataxia negative      ASSESSMENT:    Patient Active Problem List   Diagnosis    Vertigo    H/O falling    Mild closed head injury    Osteoarthritis    Hypothyroidism    Hyperlipidemia    Excessive falling    Esophageal reflux    Dysthymic disorder    Dizziness    Difficulty sleeping    COPD [facility] obstructive [facility] disease) ([facility] [address]    Adult BMI 27.0-27.9 kg/sq m    Abnormal gait    Polyneuropathy associated with underlying disease ([address]    Chronic fatigue    Gait difficulty    Balance problem    Bradykinesia    Memory problem    PD (Parkinson's disease) (HCC)    Anxiety and depression    Chronic tension-type headache, not intractable    Carotid stenosis, asymptomatic, unspecified laterality    VBI (vertebrobasilar insufficiency)    Chronic cerebral ischemia    Bilateral carpal tunnel syndrome    Ulnar neuropathy of both upper extremities    Peroneal neuropathy    Myopathy    Chronic bilateral low back pain without sciatica           ULTRASOUND EVALUATION OF THE CAROTID ARTERIES       7/3/2019       COMPARISON:   None.       HISTORY:   ORDERING SYSTEM PROVIDED HISTORY: Vertigo   TECHNOLOGIST PROVIDED HISTORY:   dizziness       FINDINGS:       RIGHT:       The right common carotid artery demonstrates peak systolic velocities of 50   and 45 cm/sec in the proximal and distal segments respectively.       The right internal carotid artery demonstrates the systolic velocities of 51,   74, and 93 cm/sec in the proximal, mid and distal segments respectively.       The external carotid artery is patent.  The vertebral artery demonstrates   normal antegrade flow.       Heterogeneous hard plaque at the bifurcation with measured stenosis of up to   53% in the proximal internal carotid artery without corresponding flow   velocity acceleration.       ICA/CCA ratio of 1.0.           LEFT:       The left common carotid artery demonstrates peak systolic velocities of 71   and 46 cm/sec in the proximal and distal segments respectively.       The left internal carotid artery demonstrates the systolic velocities of 39,   53, and 98 cm/sec in the proximal, mid and distal segments respectively.       The external carotid artery is patent.  The vertebral artery demonstrates   normal antegrade flow.       Minimal plaque at the bifurcation.       ICA/CCA ratio of 0.5.           Impression   The right internal carotid artery demonstrates 0-50% stenosis by velocity   criteria.  Plaque at the bifurcation with measured stenosis of up to 53% in   the proximal internal carotid artery without corresponding flow velocity   acceleration.     Side  Effect  Profile  Of  Medication/s   Were   Discussed                * Need   For  Further   Follow up For  The  Various  Problems Were  discussed. * Results  Of  The  Previous  Diagnostic tests were reviewed and  discussed                 Medical  Decision  Making  Was  Made  Based on the   Complexity  Of  Above  Mentioned  Diagnoses,    Data reviewed             And    Risk  Of  Significant   Co morbidities and   complicating   Factors. Medical  Decision  Was   High    Complexity   Due   To  The  Patient's  Multiple  Symptoms,  Advancing   Disease,  Complex  Treatment  Regimen,  Multiple medications           and   Risk  Of   Side  Effects,  Difficulty  In  Medication  Management  And  Diagnostic  Challenges   In  View  Of  The  Associated   Co  Morbid  Conditions   And  Problems. * FALL   PRECAUTIONS. THESE  REVIEWED   AND  DISCUSSED    *  USE   WALKING  ASSISTANCE  DEVICES     QUAD  CANE  /   WALKER          *    SUPERVISED   CARE    *   ABSOLUTELY   NO  DRIVING      *   BE  CAREFUL  WITH  ACTIVITIES            *   AVOID   NECK  AND/ BACK  STRAINING  ACTIVITIES        *   ADEQUATE   FLUIDINTAKE   AND  ELECTROLYTE  BALANCE             * KEEP  DAIRY  OF   THE  NEUROLOGICAL  SYMPTOMS        RECORDING THE    DURATION  AND  FREQUENCY. *  AVOID    CONDITIONS  AND  FACTORS   THAT  MAKE                  NEUROLOGICAL  SYMPTOMS  WORSE.                          *TO  MAINTAIN  REGULAR  SLEEP  WAKE  CYCLES. *   TO  HAVE  ADEQUATE  REST  AND   SLEEP    HOURS. *      WEIGHT   LOSS.              *    AVOID  ANY USAGE OF    TOBACCO,              EXCESSIVE  ALCOHOL  AND   ILLEGAL   SUBSTANCES        *  CONTINUE   MEDICATIONS    PRESCRIBED   BY NEUROLOGIST                           AS    RECOMMENDED       *   Compliance   With  Medications   And  Instructions            *    MIGRAINE/ HEADACHE    DAIRY   WITH  MONITORING                       OF  DURATION AND  FREQUENCY. *  May   Use  Pill  Box,    If  Needed        *    Antiplatelet  therapy    As   Recommended  Was   Discussed        *    Prophylactic  Use   Of     Vitamin   B   Complex,  Folic  Acid,    Vitamin  B12    Multivitamin,       Calcium  With  magnesium  And  Vit D    Supplementations   Over  The  Counter  Discussed             *  PATIENT  IS  ALSO   COUNSELED   TO  KEEP    ACTIVITIES:         A)   SIMPLE      B)  ORGANIZED      C)  WRITEDOWN                 *  EVALUATIONS  AND  FOLLOW UP:                  * PHYSICAL  THERAPY               *    OCCUPATIONAL THERAPY   / SPEECH  THERAPY                               *CARDIOLOGY                              *      PATIENT     STARTED  ON  SINEMET      IN   June 2019                            WITH   SIGNIFICANT  IMPROVEMENT  IN   PARKINSONIAN  FEATURES                              NO   RECURRENCE  OF  FALLING. PATIENT  TOLERATING  THE  SINEMET                          *     EMG/ NC   STUDIES  IN  AUG. 2019      SHOWED                                   A)  MODERATE  CARPAL  TUNNEL  SYNDROME                                  B)   ULNAR  NEUROPATHY  BILATERALLY                    *          ADVISED  -      TO   WEAR  WRIST  BRACES                                                     TO  AVOID  PRESSURE  OVER  ULNAR  ASPECTS                                 C)     MODERATE  PERIPHERAL POLYNEUROPATHY                                D)    EARLY  MYOPATHY   BOTH  LOWER  EXTREMITIES                                 *       ADVISED   -    FALL  PRECAUTIONS                                              -     USE  WALKER  REGULARLY                                             -   PT/  OT                *        EXPECTATIONS,   GOALS   OF   THERAPY   WITH  MEDICATIONS                                      AND  SIDE  EFFECTS  MEDICATION    WERE                                 REVIEWED     AND   DISCUSSED    IN    DETAIL. AS   DISCUSSED    WITH  PATIENT   AND  HER  FAMILY                                    SINEMET  CR      WILL  BE  ADDED   ALSO                                             *             PATIENT     RECOMMENDED   SUPERVISED      CARE                                       AND     ASSISTED  LIVING                               -    DISCUSSED   WITH  PATIENT  AND  HER  DAUGHTER                                          MULTIPLE  TIMES                                     Orders Placed This Encounter   Medications    carbidopa-levodopa (SINEMET)  MG per tablet     Sig: TAKE ONE TABLET THREE TIMES DAILY WITH FOOD     Dispense:  90 tablet     Refill:  3     This prescription was filled on 10/4/2019. Any refills authorized will be placed on file.  carbidopa-levodopa (SINEMET CR)  MG per extended release tablet     Sig: Take 1 tablet by mouth 2 times daily     Dispense:  60 tablet     Refill:  3                          *PATIENT   TO  FOLLOW  UP  WITH   PRIMARY  CARE         OTHER  CONSULTANTS  AS  BEFORE.               *TO  FOLLOW  WITH   MENTAL  HEALTH  PROFESSIONALS ,  INCLUDING            PSYCHOLOGICAL  COUNSELING   AND  PSYCHIATRIC  EVALUTIONS,             IF  THE  PATIENT  IS  WILLING. *  Maintain   Healthy  Life Style    With   Periodic  Monitoring  Of      Any  Medical  Conditions  Including   Elevated  Blood  Pressure,  Lipid  Profile,     Blood  Sugar levels  AndHeart  Disease. *   Period   Screening  For  Cancers  Involving  Breast,  Colon,    lungs  And  Other  Organs  As  Applicable,  In consultation   With  Your  Primary Care Providers. *Second  Neurological  Opinion  And  Evaluations  In  [facility] AND [facility]  Setting  If  Patient  Is  Interested. * Please   Contact   Neurology  Clinic   Early   If   Are  Any  New  Neurological   And  Any neurological  Concerns.                   *  If  The  Patient remains  Neurologically  Stable Return   To  [facility] Neurology Department   IN    3 - 4     MONTHS  TIME   FOR  FURTHER              FOLLOW UP.                       *   The  Neurological   Findings,  Possible  Diagnosis,  Differential diagnoses                    And  Options  For    Further   Investigations                   And  management   Are  Discussed  Comprehensively. Medications   And  Prescription   Risks  And  Side effects  Are   Also  Discussed. *  If   There is  Any  Significant  Worsening   Of  Current  Symptoms  And  Or                  If patient  Develops   Any additional  New  NeurologicalSymptoms                  Or  Significant  Concerns   Should  Call  911 or  Go  To  Emergency  Department  For  Further  Immediate  Evaluation. The   Above  Were  Reviewed  With  Patient   AND  HER  FAMILY         and                       questions  Answered  In  Detail. More   Than  50% of face  To face Time   Was  Spent  On  Counseling                    And   Coordination  Of  Care   Of   Patient's  multiple   Neurological  Problems                         And   Comorbid  Medical   Conditions. Electronically signed by Carlene Presley MD    Board Certified in  Neurology &  In  [employer] ([employer])      DISCLAIMER:   Although every effort was made to ensure the accuracy of this  electronictranscription, some errors in transcription may have occurred. GENERAL PATIENT INSTRUCTIONS:      A Healthy Lifestyle: Care Instructions   Your Care Instructions   A healthy lifestyle can help you feel good, stay at ahealthy weight, and have plenty of energy for both work and play. A healthy lifestyle is something you can share with your whole family.    A healthy lifestyle also can lower your risk for serious health problems, such ashigh blood pressure, heart disease, and and other lung illnesses. Quitting is hard, but there are ways to boost your chance of quitting tobacco for good.  Use nicotine gum, patches, or lozenges.  Ask your doctor about stop-smoking programs and medicines.  Keep trying.  In addition to reducing your risk of diseases in the future, you will notice some benefits soon after you stop using tobacco. If you have shortness of breath or asthma symptoms, they will likely getbetter within a few weeks after you quit.  Limit how much alcohol you drink. Moderate amounts of alcohol (up to 2 drinks a day for men, 1drink a day for women) are okay. But drinking too much can lead to liver problems, high blood pressure, and other health problems.  health   If you have a family, there are many things you can do together to improve your health.  Eat meals together as a family as often as possible.  Eat healthy foods. This includes fruits, vegetables, lean meats and dairy, and whole grains.  Include your family in your fitness plan. Most peoplethink of activities such as jogging or tennis as the way to fitness, but there are many ways you and your family can be more active. Anything that makes you breathe hard and gets your heart pumping is exercise. Here are sometips:   Walk to do errands or to take your child to school or the bus.  Go for a family bike ride after dinner instead of watching TV.  Where can you learn more?  Go [email or web address] and sign in to your [employer] account. Enter C225 in the Search HealthInformation box to learn more about \"A Healthy Lifestyle: Care Instructions. \"     If you do not have anaccount, please click on the \"Sign Up Now\" link.  Current as of: July 26, 2016   Content Version: 11.2   © 6078-3432 Healthwise, [employer]. Care instructions adapted under license by [facility] ([facility]).  If you have questions about a medical condition or this instruction, always ask your healthcare professional.

## 2020-03-25 RX ORDER — CARBIDOPA AND LEVODOPA 50; 200 MG/1; MG/1
TABLET, EXTENDED RELEASE ORAL
Qty: 60 TABLET | Refills: 3 | Status: SHIPPED | OUTPATIENT
Start: 2020-03-25 | End: 2020-06-03 | Stop reason: SDUPTHER

## 2020-06-03 ENCOUNTER — OFFICE VISIT (OUTPATIENT)
Dept: NEUROLOGY | Age: 75
End: 2020-06-03
Payer: MEDICARE

## 2020-06-03 VITALS
BODY MASS INDEX: 25.38 KG/M2 | SYSTOLIC BLOOD PRESSURE: 118 MMHG | HEART RATE: 60 BPM | RESPIRATION RATE: 14 BRPM | WEIGHT: 125.88 LBS | HEIGHT: 59 IN | DIASTOLIC BLOOD PRESSURE: 66 MMHG

## 2020-06-03 PROCEDURE — G8400 PT W/DXA NO RESULTS DOC: HCPCS | Performed by: PSYCHIATRY & NEUROLOGY

## 2020-06-03 PROCEDURE — 99215 OFFICE O/P EST HI 40 MIN: CPT | Performed by: PSYCHIATRY & NEUROLOGY

## 2020-06-03 PROCEDURE — G8427 DOCREV CUR MEDS BY ELIG CLIN: HCPCS | Performed by: PSYCHIATRY & NEUROLOGY

## 2020-06-03 PROCEDURE — 3017F COLORECTAL CA SCREEN DOC REV: CPT | Performed by: PSYCHIATRY & NEUROLOGY

## 2020-06-03 PROCEDURE — 4040F PNEUMOC VAC/ADMIN/RCVD: CPT | Performed by: PSYCHIATRY & NEUROLOGY

## 2020-06-03 PROCEDURE — 1090F PRES/ABSN URINE INCON ASSESS: CPT | Performed by: PSYCHIATRY & NEUROLOGY

## 2020-06-03 PROCEDURE — G8417 CALC BMI ABV UP PARAM F/U: HCPCS | Performed by: PSYCHIATRY & NEUROLOGY

## 2020-06-03 PROCEDURE — 1123F ACP DISCUSS/DSCN MKR DOCD: CPT | Performed by: PSYCHIATRY & NEUROLOGY

## 2020-06-03 PROCEDURE — 99214 OFFICE O/P EST MOD 30 MIN: CPT

## 2020-06-03 PROCEDURE — 3023F SPIROM DOC REV: CPT | Performed by: PSYCHIATRY & NEUROLOGY

## 2020-06-03 PROCEDURE — 1036F TOBACCO NON-USER: CPT | Performed by: PSYCHIATRY & NEUROLOGY

## 2020-06-03 PROCEDURE — G8926 SPIRO NO PERF OR DOC: HCPCS | Performed by: PSYCHIATRY & NEUROLOGY

## 2020-06-03 RX ORDER — CARBIDOPA AND LEVODOPA 50; 200 MG/1; MG/1
TABLET, EXTENDED RELEASE ORAL
Qty: 60 TABLET | Refills: 3 | Status: SHIPPED | OUTPATIENT
Start: 2020-06-03 | End: 2020-10-08 | Stop reason: SDUPTHER

## 2020-06-03 ASSESSMENT — ENCOUNTER SYMPTOMS
VOMITING: 0
CHOKING: 0
BOWEL INCONTINENCE: 0
VISUAL CHANGE: 0
VOICE CHANGE: 0
COLOR CHANGE: 0
ABDOMINAL PAIN: 0
FACIAL SWELLING: 0
CHANGE IN BOWEL HABIT: 0
EYE PAIN: 0
BLOOD IN STOOL: 0
BACK PAIN: 1
PHOTOPHOBIA: 0
CONSTIPATION: 0
SHORTNESS OF BREATH: 0
EYE ITCHING: 0
EYE DISCHARGE: 0
TROUBLE SWALLOWING: 0
ABDOMINAL DISTENTION: 0
CHEST TIGHTNESS: 0
COUGH: 0
APNEA: 0
SWOLLEN GLANDS: 0
NAUSEA: 0
WHEEZING: 0
SINUS PRESSURE: 0
DIARRHEA: 0
EYE REDNESS: 0
SORE THROAT: 0

## 2020-06-03 NOTE — PROGRESS NOTES
bleeding disorder, a clotting disorder, a CVA, liver disease or seizures. History obtained from  The patient  AND  HER    DAUGHTER        and other  available   medical  records   were  Also  reviewed. The  Duration,  Quality,  Severity,  Location,  Timing,  Context,  Modifying  Factors   Of   The   Chief   Complaint   And  Present  Illness       Was   Reviewed   In   Chronological   [facility]. PATIENT'S  MAIN  CONCERNS INCLUDE :                       1)     PREVIOUS      H/O       RECURRENT    FALLING      SINCE     2017                                  H/O    EXCESSIVE   FALLING    IN      MAY    2019                                                       2)    H/O     DIZZY     SPELLS     SINCE    June 2019                                           -     IMPROVED    AND  STABLE                                       TO  TAKE    ASA  81  MG  PO  DAILY                              3)     NO   H/O   SYNCOPE.                           NO   H/O   SEIZURE   ACTIVITY                                  4)     H/O     ER   VISIT       ON   6/06/ 2019                                          DUE  TO   MILD  HEAD INJURY  DUE  TO  FALL                                     CT    HEAD     AND  CT   CERVICAL  SPINE        SHOWED                                        NO  ACUTE  PATHOLOGY                             5)   MULTIPLE  CO MORBID  MEDICAL  CONDITIONS                                BEING  FOLLOWED  BY     HER  PCP.                                 6)   H/O   CHRONIC  GAIT  AND  BALANCE  PROBLEMS                                  PATIENT  USING  CANE /   WALKER       FOR     2   YEARS                              7)    H/O   CHRONIC  FATIGUE    AND                                   GENERALIZED   WEAKNESS                                   8)     H/O   [facility]  MEMORY  PROBLEMS        AND   DEMENTIA                                     FOR    3   YEARS partner violence     Fear of current or ex partner: Not on file     Emotionally abused: Not on file     Physically abused: Not on file     Forced sexual activity: Not on file   Other Topics Concern    Not on file   Social History Narrative    Not on file       Vitals:    06/03/20 1048   BP: 118/66   Pulse: 60   Resp: 14         Wt Readings from Last 3 Encounters:   06/03/20 125 lb 14.1 oz (57.1 kg)   01/14/20 125 lb 12.8 oz (57.1 kg)   10/10/19 132 lb 9.6 oz (60.1 kg)         BP Readings from Last 3 Encounters:   06/03/20 118/66   01/14/20 138/68   10/10/19 126/74       Hematology and Coagulation  Lab Results   Component Value Date    WBC 6.4 05/10/2019    RBC 4.65 05/10/2019    HGB 14.0 05/10/2019    HCT 43.6 05/10/2019    MCV 93.8 05/10/2019    MCH 30.1 05/10/2019    MCHC 32.1 05/10/2019    RDW 13.6 05/10/2019    PLT See Reflexed IPF Result 05/10/2019    MPV NOT REPORTED 05/10/2019     No results found for: ESR    Chemistries  Lab Results   Component Value Date     05/10/2019    K 5.1 05/10/2019    CL 97 05/10/2019    CO2 25 05/10/2019    BUN 11 05/10/2019    CREATININE 0.60 05/10/2019    CALCIUM 9.8 05/10/2019    PROT 6.6 05/10/2019    LABALBU 4.5 05/10/2019    BILITOT 0.71 05/10/2019    ALKPHOS 86 05/10/2019    AST 25 05/10/2019    ALT 13 05/10/2019     Lab Results   Component Value Date    ALKPHOS 86 05/10/2019    ALT 13 05/10/2019    AST 25 05/10/2019    PROT 6.6 05/10/2019    BILITOT 0.71 05/10/2019    LABALBU 4.5 05/10/2019     Lab Results   Component Value Date    BUN 11 05/10/2019    CREATININE 0.60 05/10/2019     Lab Results   Component Value Date    CALCIUM 9.8 05/10/2019     Lab Results   Component Value Date    AST 25 05/10/2019    ALT 13 05/10/2019       Review of Systems   Constitutional: Positive for fatigue. Negative for appetite change, chills, diaphoresis, fever and unexpected weight change.    HENT: Negative for congestion, dental problem, drooling, ear discharge, ear pain, facial  Vertigo    H/O falling    Mild closed head injury    Osteoarthritis    Hypothyroidism    Hyperlipidemia    Excessive falling    Esophageal reflux    Dysthymic disorder    Dizziness    Difficulty sleeping    COPD (chronic obstructive pulmonary disease) ([facility])    Adult BMI 27.0-27.9 kg/sq m    Abnormal gait    Polyneuropathy associated with underlying disease ([facility])    Chronic fatigue    Gait difficulty    Balance problem    Bradykinesia    Memory problem    PD (Parkinson's disease) ([facility])    Anxiety and depression    Chronic tension-type headache, not intractable    Carotid stenosis, asymptomatic, unspecified laterality    VBI (vertebrobasilar insufficiency)    Chronic cerebral ischemia    Bilateral carpal tunnel syndrome    Ulnar neuropathy of both upper extremities    Peroneal neuropathy    Myopathy    Chronic bilateral low back pain without sciatica           ULTRASOUND EVALUATION OF THE CAROTID ARTERIES       7/3/2019       COMPARISON:   None.       HISTORY:   ORDERING SYSTEM PROVIDED HISTORY: Vertigo   TECHNOLOGIST PROVIDED HISTORY:   dizziness       FINDINGS:       RIGHT:       The right common carotid artery demonstrates peak systolic velocities of 50   and 45 cm/sec in the proximal and distal segments respectively.       The right internal carotid artery demonstrates the systolic velocities of 51,   74, and 93 cm/sec in the proximal, mid and distal segments respectively.       The external carotid artery is patent.  The vertebral artery demonstrates   normal antegrade flow.       Heterogeneous hard plaque at the bifurcation with measured stenosis of up to   53% in the proximal internal carotid artery without corresponding flow   velocity acceleration.       ICA/CCA ratio of 1.0.           LEFT:       The left common carotid artery demonstrates peak systolic velocities of 71   and 46 cm/sec in the proximal and distal segments respectively.       The left internal carotid artery a candy bar. Try to have fruits and/or vegetables at everymeal.   Make exercise part of your daily routine. You may want to start with simple activities, such as walking, bicycling, or slow swimming. Try mely active 30 to 60 minutes every day. You do not need to do all 30 to 60 minutes all at once. For example, you can exercise 3 times a day for 10 or 20 minutes. Moderate exercise is safe for most people, but it is always agood idea to talk to your doctor before starting an exercise program.   Keep moving. [place] the lawn, work in the garden, or [place]. Take the stairs instead of the elevator at work.  If you smoke, quit. Peoplewho smoke have an increased risk for heart attack, stroke, cancer, and other lung illnesses. Quitting is hard, but there are ways to boost your chance of quitting tobacco for good.  Use nicotine gum, patches, or lozenges.  Ask your doctor about stop-smoking programs and medicines.  Keep trying.  In addition to reducing your risk of diseases in the future, you will notice some benefits soon after you stop using tobacco. If you have shortness of breath or asthma symptoms, they will likely getbetter within a few weeks after you quit.  Limit how much alcohol you drink. Moderate amounts of alcohol (up to 2 drinks a day for men, 1drink a day for women) are okay. But drinking too much can lead to liver problems, high blood pressure, and other health problems.  health   If you have a family, there are many things you can do together to improve your health.  Eat meals together as a family as often as possible.  Eat healthy foods. This includes fruits, vegetables, lean meats and dairy, and whole grains.  Include your family in your fitness plan. Most peoplethink of activities such as jogging or tennis as the way to fitness, but there are many ways you and your family can be more active. Anything that makes you breathe hard and gets your heart pumping is exercise.  Here are sometips:   Walk to do errands or to take your child to school or the bus.  Go for a family bike ride after dinner instead of watching TV.  Where can you learn more?  Go toOpen [email or web address]://kasia.[email or web address] and sign in to your [employer] account. Enter J688 in the Search HealthInformation box to learn more about \"A Healthy Lifestyle: Care Instructions. \"     If you do not have anaccount, please click on the \"Sign Up Now\" link.  Current as of: July 26, 2016   Content Version: 11.2   © 8380-7358 [employer]. Care instructions adapted under license by [facility] ([facility]). If you have questions about a medical condition or this instruction, always ask your healthcare professional. [employer] disclaims any warranty or liability for your use of this information.

## 2020-10-08 ENCOUNTER — OFFICE VISIT (OUTPATIENT)
Dept: NEUROLOGY | Age: 75
End: 2020-10-08
Payer: MEDICARE

## 2020-10-08 VITALS
HEIGHT: 59 IN | DIASTOLIC BLOOD PRESSURE: 64 MMHG | TEMPERATURE: 97 F | WEIGHT: 120 LBS | RESPIRATION RATE: 16 BRPM | BODY MASS INDEX: 24.19 KG/M2 | HEART RATE: 50 BPM | OXYGEN SATURATION: 98 % | SYSTOLIC BLOOD PRESSURE: 116 MMHG

## 2020-10-08 PROCEDURE — 99215 OFFICE O/P EST HI 40 MIN: CPT | Performed by: PSYCHIATRY & NEUROLOGY

## 2020-10-08 PROCEDURE — G8420 CALC BMI NORM PARAMETERS: HCPCS | Performed by: PSYCHIATRY & NEUROLOGY

## 2020-10-08 PROCEDURE — 3023F SPIROM DOC REV: CPT | Performed by: PSYCHIATRY & NEUROLOGY

## 2020-10-08 PROCEDURE — 4040F PNEUMOC VAC/ADMIN/RCVD: CPT | Performed by: PSYCHIATRY & NEUROLOGY

## 2020-10-08 PROCEDURE — G8427 DOCREV CUR MEDS BY ELIG CLIN: HCPCS | Performed by: PSYCHIATRY & NEUROLOGY

## 2020-10-08 PROCEDURE — 1123F ACP DISCUSS/DSCN MKR DOCD: CPT | Performed by: PSYCHIATRY & NEUROLOGY

## 2020-10-08 PROCEDURE — 3017F COLORECTAL CA SCREEN DOC REV: CPT | Performed by: PSYCHIATRY & NEUROLOGY

## 2020-10-08 PROCEDURE — G8400 PT W/DXA NO RESULTS DOC: HCPCS | Performed by: PSYCHIATRY & NEUROLOGY

## 2020-10-08 PROCEDURE — G8484 FLU IMMUNIZE NO ADMIN: HCPCS | Performed by: PSYCHIATRY & NEUROLOGY

## 2020-10-08 PROCEDURE — 1090F PRES/ABSN URINE INCON ASSESS: CPT | Performed by: PSYCHIATRY & NEUROLOGY

## 2020-10-08 PROCEDURE — 99214 OFFICE O/P EST MOD 30 MIN: CPT

## 2020-10-08 PROCEDURE — G8926 SPIRO NO PERF OR DOC: HCPCS | Performed by: PSYCHIATRY & NEUROLOGY

## 2020-10-08 PROCEDURE — 1036F TOBACCO NON-USER: CPT | Performed by: PSYCHIATRY & NEUROLOGY

## 2020-10-08 RX ORDER — CARBIDOPA AND LEVODOPA 50; 200 MG/1; MG/1
TABLET, EXTENDED RELEASE ORAL
Qty: 60 TABLET | Refills: 5 | Status: SHIPPED | OUTPATIENT
Start: 2020-10-08 | End: 2021-01-13

## 2020-10-08 ASSESSMENT — ENCOUNTER SYMPTOMS
BOWEL INCONTINENCE: 0
SORE THROAT: 0
SHORTNESS OF BREATH: 0
CHEST TIGHTNESS: 0
SINUS PRESSURE: 0
BACK PAIN: 1
VISUAL CHANGE: 0
EYE REDNESS: 0
SWOLLEN GLANDS: 0
WHEEZING: 0
EYE DISCHARGE: 0
TROUBLE SWALLOWING: 0
EYE PAIN: 0
ABDOMINAL PAIN: 0
APNEA: 0
BLOOD IN STOOL: 0
CHOKING: 0
COLOR CHANGE: 0
EYE ITCHING: 0
COUGH: 0
FACIAL SWELLING: 0
PHOTOPHOBIA: 0
VOMITING: 0
ABDOMINAL DISTENTION: 0
NAUSEA: 0
CONSTIPATION: 0
CHANGE IN BOWEL HABIT: 0
VOICE CHANGE: 0
DIARRHEA: 0

## 2020-10-08 NOTE — PROGRESS NOTES
[facility]  Neurology  [address] [address]  ZCUGT:[phone]   Fax: [phone]    SUBJECTIVE:     PATIENT ID:  Suzie Elliott is a  RIGHT     HANDED 70 y.o. female. Dizziness   This is a recurrent problem. Episode onset: JUNE 2019. The problem occurs intermittently. The problem has been resolved. Associated symptoms include fatigue, headaches, neck pain, vertigo and weakness. Pertinent negatives include no abdominal pain, anorexia, arthralgias, change in bowel habit, chest pain, chills, congestion, coughing, diaphoresis, fever, joint swelling, myalgias, nausea, rash, sore throat, swollen glands, urinary symptoms, visual change or vomiting. The symptoms are aggravated by standing, stress and walking. She has tried rest and sleep for the symptoms. The treatment provided significant relief. Neurologic Problem   The patient's primary symptoms include clumsiness, focal sensory loss, focal weakness, a loss of balance, memory loss, slurred speech and weakness. The patient's pertinent negatives include no altered mental status, near-syncope, syncope or visual change. Primary symptoms comment: H/O   RECURRENT  FALLING      GAIT  AND  BALANCE  PROBLES,   FATIGUE   . This is a chronic problem. Episode onset: MORE  THAN   2  YEARS. The neurological problem developed insidiously. The problem is unchanged. There was lower extremity, left-sided, right-sided and upper extremity focality noted. Associated symptoms include back pain, confusion, dizziness, fatigue, headaches, neck pain and vertigo. Pertinent negatives include no abdominal pain, auditory change, aura, bladder incontinence, bowel incontinence, chest pain, diaphoresis, fever, light-headedness, nausea, palpitations, shortness of breath or vomiting. Past treatments include nothing. The treatment provided no relief. Her past medical history is significant for dementia, head trauma and mood changes.  There is no history of a bleeding disorder, a clotting disorder, a CVA, liver disease or seizures. History obtained from  The patient  AND  HER    DAUGHTER        and other  available   medical  records   were  Also  reviewed. The  Duration,  Quality,  Severity,  Location,  Timing,  Context,  Modifying  Factors   Of   The   Chief   Complaint       And  Present  Illness   Was   Reviewed   In   Chronological   [facility]. PATIENT'S  MAIN  CONCERNS INCLUDE :                       1)     PREVIOUS      H/O       RECURRENT    FALLING      SINCE     2017                                  H/O    EXCESSIVE   FALLING    IN      MAY    2019                                                       2)    H/O     DIZZY     SPELLS     SINCE    June 2019                                           -     IMPROVED    AND  STABLE                                       TO  TAKE    ASA  81  MG  PO  DAILY                              3)     NO   H/O   SYNCOPE.                           NO   H/O   SEIZURE   ACTIVITY                                  4)     H/O     ER   VISIT       ON   6/06/ 2019                                          DUE  TO   MILD  HEAD INJURY  DUE  TO  FALL                                     CT    HEAD     AND  CT   CERVICAL  SPINE        SHOWED                                        NO  ACUTE  PATHOLOGY                             5)   MULTIPLE  CO MORBID  MEDICAL  CONDITIONS                                BEING  FOLLOWED  BY     HER  PCP.                                 6)   H/O   CHRONIC  GAIT  AND  BALANCE  PROBLEMS                                  PATIENT  USING  CANE /   WALKER       FOR     2   YEARS                              7)    H/O   CHRONIC  FATIGUE    AND                                   GENERALIZED   WEAKNESS                                   8)     H/O   [facility]  MEMORY  PROBLEMS        AND   DEMENTIA                                     FOR    3   YEARS -  MAY  BENEFIT  FROM  MEDICATION   IF                                   SHE IS  WILLING    FOR  THE  SAME. PATIENT    AND   HER  DAUGHTER  NOT  INTERESTED                                    IN   MEDICATION  FOR  THE  SAME. 9)   H/O  CHRONIC    MILD  ANXIETY ,   DEPRESSION                                        FOR     3  YEARS                            PATIENT  TO  FOLLOW  WITH  MENTAL  HEALTH    PROFESSIONALS                        10)     SENSORY  PERIPHERAL  POLYNEUROPATHY                                               11)     BRADYKINESIA,   SPEECH  DIFFICULTY    FALLING                                 PROGRESSIVE     IN  NATURE      FOR    2   YEARS                                -    MODERATE    PARKINSON'S  DISEASE                          12)     H/O    CHRONIC  TENSION  HEADACHE                                        SINCE    JAN. 2019                         13)           PATIENT    LIVES  BY   HER SELF. PATIENT  DOES  NOT  DRIVE . PATIENT  GETTING      HELP    FROM   VISITING  NURSE                                      AS PER  HER  DAUGHTER                                              14)     PATIENT     STARTED  ON  SINEMET      IN  June 2019                          WITH   SIGNIFICANT  IMPROVEMENT  IN   PARKINSONIAN  FEATURES                                NO   RECURRENCE  OF  FALLING. PATIENT  TOLERATING  THE  SINEMET                                         15)      HAD    NEURO  DIAGNOSTIC  EVALUATIONS   IN    AUG.   2019                                     A)   CAROTID  DOPPLER    -   ASYMPTOMATIC    STENOSIS                                                          ALREADY  ON  ASA  DAILY                                                          -   NEEDS  MONITORING                                     B)    TCD    -   MODERATE WITH  PATIENT  AND  HER  DAUGHTER                                          MULTIPLE  TIMES                           20)      H/O   BRIEF     VISUAL  HALLUCINATIONS                                             -  PATIENT    NOT   CONCERNED                                 NO    RECURRENCE OF  THE     SAME.                                    -   NEEDS  MONITORING                                         21 )         VARIOUS  RISK   FACTORS   WERE  REVIEWED   AND   DISCUSSED. PATIENT   HAS  MULTIPLE   MEDICAL, NEUROLOGICAL                        AND   MENTAL HEALTH   PROBLEMS . PATIENT'S   MANAGEMENT  IS  CHALLENGING.                    22)         PATIENT  INDICATED    THAT   SHE  IS     DOING   BETTER. PATIENT     AND  HER  DAUGHTER    DENIES  ANY    NEW  NEUROLOGICAL  CONCERNS.                                           PRECIPITATING  FACTORS: including  fever/infection, exertion/relaxation, position change, stress, weather change,                        medications/alcohol, time of day/darkness/light  Are  absent                                                              MODIFYING  FACTORS:  fever/infection, exertion/relaxation, position change, stress, weather change,                        medications/alcohol, time of day/darkness/light  Are  absent             Patient   Indicates   The  Presence   And  The  Absence  Of  The  Following    Associated  And   Additional  Neurological    Symptoms:                                Balance  And coordination   problems  present           Gait problems     present            Headaches      absent              Migraines           absent           Memory problemspresent              Confusion        present            Paresthesia   numbness          present           Seizures  And  Starring  Episodes           absent           Syncope,  Near  syncopal episodes         absent           Speech problems           absent             Swallowing   Problems      absent            Dizziness,  Light headedness           present              Vertigo        present             Generalized   Weakness    present              focal  Weakness     absent             Tremors         absent              Sleep  Problems     present             History  Of   Recent  Head  Injury     present             History  Of   Recent  TIA     absent             History  Of   Recent    Stroke     absent             Neck  Pain   and   Neck muscle  Spasms  present               Radiating  down   And   Weakness           absent            Lower back   Pain  And     Spasms  absent              Radiating    Down   And   Weakness          absent                H/OFALLS        present               History  Of   Visual  Symptoms    absent                  Associated   Diplopia       absent                                               Also   Additional   Symptoms   Present    As  Documented    In   The   detailed                  Review  Of  Systems   And    Please   Refer   To    Them for   Additional    Information. Any components  That are either  Unobtainable  Or  Limited  In   HPI, ROS  And/or PFSH   Are                   Due   ToPatient's  Medical  Problems,  Clinical  Condition   and/or lack of                                other    Alternate   resources.           RECORDS   REVIEWED:    historical medical records     INFORMATION   REVIEWED:       MEDICAL   HISTORY,SURGICAL   HISTORY,   MEDICATIONS   LIST,   ALLERGIES AND  DRUG  INTOLERANCES,     FAMILY   HISTORY,  SOCIAL  HISTORY,    PROBLEM  LIST   FOR  PATIENT  CARE   COORDINATION    Past Medical History:   Diagnosis Date    Abnormal gait     Adult BMI 27.0-27.9 kg/sq m     COPD (chronic obstructive pulmonary disease) (HCC)     Difficulty sleeping     Dizziness     Dysthymic disorder     Esophageal reflux     Excessive falling     HTN (hypertension)     NATURAL) 100 MG tablet Take 50 mg by mouth daily      calcium carbonate-vitamin D (CALCIUM 600 + D) 600-400 MG-UNIT TABS per tab Take 1 tablet by mouth daily      fluticasone-vilanterol (BREO ELLIPTA) 100-25 MCG/INH AEPB inhaler Inhale into the lungs daily      docusate sodium (COLACE) 100 MG capsule Take 100 mg by mouth 2 times daily      Ascorbic Acid (VITAMIN C) 100 MG tablet Take 100 mg by mouth daily      DULoxetine (CYMBALTA) 60 MG extended release capsule Take 60 mg by mouth 2 times daily      Cholecalciferol (D3-1000 PO) Take by mouth daily      diltiazem (CARDIZEM 12 HR) 120 MG extended release capsule Take 120 mg by mouth daily      FIBER COMPLETE PO Take by mouth daily      gabapentin (NEURONTIN) 300 MG capsule Take 600 mg by mouth 3 times daily.  senna (SENOKOT) 8.6 MG tablet Take 1 tablet by mouth daily       No current facility-administered medications for this visit.           Allergies   Allergen Reactions    Fentanyl Other (See Comments)     Hallucinations     Atorvastatin     Pregabalin     Tramadol          Family History   Problem Relation Age of Onset    Stroke Mother          Social History     Socioeconomic History    Marital status: Single     Spouse name: Not on file    Number of children: Not on file    Years of education: Not on file    Highest education level: Not on file   Occupational History    Not on file   Social Needs    Financial resource strain: Not on file    Food insecurity     Worry: Not on file     Inability: Not on file    Transportation needs     Medical: Not on file     Non-medical: Not on file   Tobacco Use    Smoking status: Never Smoker    Smokeless tobacco: Never Used   Substance and Sexual Activity    Alcohol use: Not Currently    Drug use: Never    Sexual activity: Not on file   Lifestyle    Physical activity     Days per week: Not on file     Minutes per session: Not on file    Stress: Not on file   Relationships    Social connections     Talks on phone: Not on file     Gets together: Not on file     Attends Scient[employer] service: Not on file     Active member of club or organization: Not on file     Attends meetings of clubs or organizations: Not on file     Relationship status: Not on file    Intimate partner violence     Fear of current or ex partner: Not on file     Emotionally abused: Not on file     Physically abused: Not on file     Forced sexual activity: Not on file   Other Topics Concern    Not on file   Social History Narrative    Not on file       Vitals:    10/08/20 0939   BP: 116/64   Pulse: 50   Resp: 16   Temp: 97 °F (36.1 °C)   SpO2: 98%         Wt Readings from Last 3 Encounters:   10/08/20 120 lb (54.4 kg)   06/03/20 125 lb 14.1 oz (57.1 kg)   01/14/20 125 lb 12.8 oz (57.1 kg)         BP Readings from Last 3 Encounters:   10/08/20 116/64   06/03/20 118/66   01/14/20 138/68       Hematology and Coagulation  Lab Results   Component Value Date    WBC 6.4 05/10/2019    RBC 4.65 05/10/2019    HGB 14.0 05/10/2019    HCT 43.6 05/10/2019    MCV 93.8 05/10/2019    MCH 30.1 05/10/2019    MCHC 32.1 05/10/2019    RDW 13.6 05/10/2019    PLT See Reflexed IPF Result 05/10/2019    MPV NOT REPORTED 05/10/2019     No results found for: ESR    Chemistries  Lab Results   Component Value Date     05/10/2019    K 5.1 05/10/2019    CL 97 05/10/2019    CO2 25 05/10/2019    BUN 11 05/10/2019    CREATININE 0.60 05/10/2019    CALCIUM 9.8 05/10/2019    PROT 6.6 05/10/2019    LABALBU 4.5 05/10/2019    BILITOT 0.71 05/10/2019    ALKPHOS 86 05/10/2019    AST 25 05/10/2019    ALT 13 05/10/2019     Lab Results   Component Value Date    ALKPHOS 86 05/10/2019    ALT 13 05/10/2019    AST 25 05/10/2019    PROT 6.6 05/10/2019    BILITOT 0.71 05/10/2019    LABALBU 4.5 05/10/2019     Lab Results   Component Value Date    BUN 11 05/10/2019    CREATININE 0.60 05/10/2019     Lab Results   Component Value Date    CALCIUM 9.8 05/10/2019     Lab Results Aphasia. LOW  VOICE                     Able   To  Follow    SIMPLE       Stepcommands   without   Any  Difficulty. No right  To left confusion. SLOW    Speech  And language function. Insight and  Judgment ,Fund  Of  Knowledge   DECREASED                Recent  And  Remote memory  DECREASED                Attention &  Concentration are   DECREASED                                                    B) CRANIAL NERVES :      CN : Visual  Acuity  And  Visual fields  within normal limits               Fundi  Could  Not  Be  Could  Not  Be  Evaluated. 3,4,6 CN : Both  Pupils are   Reactive and  Equal.  Movements  Are  Intact. No  Nystagmus. No  GILLES. No  Afferent  Pupillary  Defect noted. 5 CN :  Normal  Facial sensations and Corneal  Reflexes           7 CN:  Normal  Facial  Symmetry  And  Strength. No facial  Weakness. 8 CN :  Hearing  Appears   DECREASED          9, 10 CN: Normal   spontaneous, reflex   palate   movements         11 CN:   Normal  Shoulder  shrug and  strength         12 CN :   Normal  Tongue movements and  Tongue  In midline                        No tongue   Fasciculations or atrophy       C) MOTOR  EXAM:                 Strength  In upper  And  Lower   extremities     4 + 5    BILATERALLY                      Rapid   alternating  And  repetitions  Movements  DECREASED               Muscle  Tone  In upper  And  Lower  Extremities  normal                No rigidity. No  Spasticity. Bradykinesia   PRESENT                  No  Asterixis.               Sustention  Tremor , Resting   Tremor   absent                    No   other  Abnormal  Movements noted           D) SENSORY :               Light   touch, pinprick,   position  And  Vibration   DECREASED  IN  GLOVE  AND  STOCKING  MANNER        E) REFLEXES:                   Deep  Tendon  Reflexes   DECRESED                  No pathological  Reflexes  Bilaterally.                                   F) COORDINATION  AND  GAIT :                                Station and  Gait  SLOW  UNSTEADY                             Romberg 's test   POSITIVE                            Ataxia negative      ASSESSMENT:    Patient Active Problem List   Diagnosis    Vertigo    H/O falling    Mild closed head injury    Osteoarthritis    Hypothyroidism    Hyperlipidemia    Excessive falling    Esophageal reflux    Dysthymic disorder    Dizziness    Difficulty sleeping    COPD [facility]chronic obstructive pulmonary disease) ([facility])    Adult BMI 27.0-27.9 kg/sq m    Abnormal gait    Polyneuropathy associated with underlying disease ([address]    Chronic fatigue    Gait difficulty    Balance problem    Bradykinesia    Memory problem    [facility] [address]    Anxiety and depression    Chronic tension-type headache, not intractable    Carotid stenosis, asymptomatic, unspecified laterality    VBI (vertebrobasilar insufficiency)    Chronic cerebral ischemia    Bilateral carpal tunnel syndrome    Ulnar neuropathy of both upper extremities    Peroneal neuropathy    Myopathy    Chronic bilateral low back pain without sciatica           ULTRASOUND EVALUATION OF THE CAROTID ARTERIES       7/3/2019       COMPARISON:   None.       HISTORY:   ORDERING SYSTEM PROVIDED HISTORY: Vertigo   TECHNOLOGIST PROVIDED HISTORY:   dizziness       FINDINGS:       RIGHT:       The right common carotid artery demonstrates peak systolic velocities of 50   and 45 cm/sec in the proximal and distal segments respectively.       The right internal carotid artery demonstrates the systolic velocities of 51,   74, and 93 cm/sec in the proximal, mid and distal segments respectively.       The external carotid artery is patent.  The vertebral artery demonstrates   normal antegrade flow.       Heterogeneous hard plaque at the bifurcation with measured stenosis of up to   53% in the proximal internal carotid artery without corresponding flow   velocity acceleration.       ICA/CCA ratio of 1.0.           LEFT:       The left common carotid artery demonstrates peak systolic velocities of 71   and 46 cm/sec in the proximal and distal segments respectively.       The left internal carotid artery demonstrates the systolic velocities of 39,   53, and 98 cm/sec in the proximal, mid and distal segments respectively.       The external carotid artery is patent.  The vertebral artery demonstrates   normal antegrade flow.       Minimal plaque at the bifurcation.       ICA/CCA ratio of 0.5.           Impression   The right internal carotid artery demonstrates 0-50% stenosis by velocity   criteria.  Plaque at the bifurcation with measured stenosis of up to 53% in   the proximal internal carotid artery without corresponding flow velocity   acceleration.       The left internal carotid artery demonstrates 0-50% stenosis .       Bilateral vertebral arteries are patent with flow in the normal direction.                 VISITING DIAGNOSIS:      ICD-10-CM    1. PD (Parkinson's disease) ([facility]  G20 carbidopa-levodopa (SINEMET CR)  MG per extended release tablet     carbidopa-levodopa (SINEMET)  MG per tablet   2. Bilateral carpal tunnel syndrome  G56.03    3. Acquired hypothyroidism  E03.9    4. Anxiety and depression  F41.9     F32.9    5. Chronic tension-type headache, not intractable  G44.229    6. Abnormal gait  R26.9    7. Balance problem  R26.89    8. Ulnar neuropathy of both upper extremities  G56.23    9. Chronic bilateral low back pain without sciatica  M54.5     G89.29    10. Carotid stenosis, asymptomatic, unspecified laterality  I65.29    11. Gait difficulty  R26.9    12. Dysthymic disorder  F34.1    13. Peroneal neuropathy, unspecified laterality  G57.30    14. Bradykinesia  R25.8    15. Dizziness  R42    16.  Chronic obstructive pulmonary disease, unspecified COPD type ([facility]  J44.9 17. VBI (vertebrobasilar insufficiency)  G45.0    18. H/O falling  Z91.81    19. Polyneuropathy associated with underlying disease ([address]  G63    20. Vertigo  R42    21. Memory problem  R41.3    22. Gastroesophageal reflux disease, unspecified whether esophagitis present  K21.9    23. Difficulty sleeping  G47.9    24. Chronic fatigue  R53.82    25. Mild closed head injury, sequela  S09.90XS    26. Mixed hyperlipidemia  E78.2               CONCERNS   &   INCREASED   RISK   FOR         * TIA,  CEREBRO  VASCULAR  ISCHEMIA,STROKE     *   DIZZINESS,   VERTEBROBASILAR  INSUFFICIENCY ,       *   ORTHOSTATIC  INTOLERANCE,    AUTONOMIC  NEUROPATHY       *     CHRONIC  TENSION  HEADACHES      *   COGNITIVE  &   MEMORY PROBLEMS  AND  DEMENTIAS       *   PERIPHERAL  NEUROPATHY,       *  PARKINSON'S  DISEASE        *  GAIT  DIFFICULTIES  &   BALANCE PROBLMES   AND  FALL                VARIOUS  RISK   FACTORS   WERE  REVIEWED   AND   DISCUSSED. *  PATIENT   HAS  MULTIPLE   MEDICAL, NEUROLOGICAL                        AND   MENTAL HEALTH   PROBLEMS . PATIENT'S   MANAGEMENT  IS  CHALLENGING. PLAN:                     Jayleen Matthews  Of  The  Diagnoses,  The  Management & Treatment  Options            AND    Care  plan  Were          Reviewed and   Discussed   With  patient. * Goals  And  Expectations  Of  The  Therapy  Discussed   And  Reviewed. *   Benefits   And   Side  Effect  Profile  Of  Medication/s   Were   Discussed                * Need   For  Further   Follow up For  The  Various  Problems Were  discussed. * Results  Of  The  Previous  Diagnostic tests were reviewed and  discussed                 Medical  Decision  Making  Was  Made  Based on the   Complexity  Of  Above  Mentioned  Diagnoses,    Data reviewed             And    Risk  Of  Significant   Co morbidities and   complicating   Factors.                  Medical  Decision  Was   High    Complexity   Due   To  The Patient's  Multiple  Symptoms,  Advancing   Disease,  Complex  Treatment  Regimen,  Multiple medications           and   Risk  Of   Side  Effects,  Difficulty  In  Medication  Management  And  Diagnostic  Challenges   In  View  Of  The  Associated   Co  Morbid  Conditions   And  Problems. * FALL   PRECAUTIONS. THESE  REVIEWED   AND  DISCUSSED    *  USE   WALKING  ASSISTANCE  DEVICES     QUAD  CANE  /   WALKER          *    SUPERVISED   CARE    *   ABSOLUTELY   NO  DRIVING      *   BE  CAREFUL  WITH  ACTIVITIES            *   AVOID   NECK  AND/ BACK  STRAINING  ACTIVITIES        *   ADEQUATE   FLUIDINTAKE   AND  ELECTROLYTE  BALANCE             * KEEP  DAIRY  OF   THE  NEUROLOGICAL  SYMPTOMS        RECORDING THE    DURATION  AND  FREQUENCY. *  AVOID    CONDITIONS  AND  FACTORS   THAT  MAKE                  NEUROLOGICAL  SYMPTOMS  WORSE.                          *TO  MAINTAIN  REGULAR  SLEEP  WAKE  CYCLES. *   TO  HAVE  ADEQUATE  REST  AND   SLEEP    HOURS. *      WEIGHT   LOSS. *    AVOID  ANY USAGE OF    TOBACCO,              EXCESSIVE  ALCOHOL  AND   ILLEGAL   SUBSTANCES        *  CONTINUE   MEDICATIONS    PRESCRIBED   BY NEUROLOGIST                           AS    RECOMMENDED       *   Compliance   With  Medications   And  Instructions            *    MIGRAINE/ HEADACHE    DAIRY   WITH  MONITORING                       OF  DURATION  AND  FREQUENCY.         *  May   Use  Pill  Box,    If  Needed        *    Antiplatelet  therapy    As   Recommended  Was   Discussed        *    Prophylactic  Use   Of     Vitamin   B   Complex,  Folic  Acid,    Vitamin  B12    Multivitamin,       Calcium  With  magnesium  And  Vit D    Supplementations   Over  The  Counter  Discussed             *  PATIENT  IS  ALSO   COUNSELED   TO  KEEP    ACTIVITIES:         A)   SIMPLE      B)  ORGANIZED      C)  WRITEDOWN                 *  EVALUATIONS  AND  FOLLOW UP:                  * PHYSICAL  THERAPY               *    OCCUPATIONAL THERAPY   / SPEECH  THERAPY                               *CARDIOLOGY                              *      PATIENT     STARTED  ON  SINEMET      IN   June 2019                            WITH   SIGNIFICANT  IMPROVEMENT  IN   PARKINSONIAN  FEATURES                              NO   RECURRENCE  OF  FALLING. PATIENT  TOLERATING  THE  SINEMET                          *     EMG/ NC   STUDIES  IN  AUG. 2019      SHOWED                                   A)  MODERATE  CARPAL  TUNNEL  SYNDROME                                  B)   ULNAR  NEUROPATHY  BILATERALLY                    *          ADVISED  -      TO   WEAR  WRIST  BRACES                                                     TO  AVOID  PRESSURE  OVER  ULNAR  ASPECTS                                 C)     MODERATE  PERIPHERAL POLYNEUROPATHY                                D)    EARLY  MYOPATHY   BOTH  LOWER  EXTREMITIES                                 *       ADVISED   -    FALL  PRECAUTIONS                                              -     USE  WALKER  REGULARLY                                             -   PT/  OT                   *            EXPECTATIONS,   GOALS   OF   THERAPY   WITH  MEDICATIONS                                      AND  SIDE  EFFECTS  MEDICATION    WERE                                 REVIEWED     AND   DISCUSSED    IN    DETAIL.                            *             PATIENT      ADVISED  TO CONTINUE   SINEMET    AND                                   SINEMET  CR        AS   BEFORE                                                 *          PATIENT     RECOMMENDED   SUPERVISED      CARE,                                     PERIODIC     THERAPY       AND     ASSISTED  LIVING                                 -    DISCUSSED   WITH  PATIENT  AND  HER  DAUGHTER                                                     *           PATIENT  INDICATED    THAT   SHE  IS     DOING as a family as often as possible.  Eat healthy foods. This includes fruits, vegetables, lean meats and dairy, and whole grains.  Include your family in your fitness plan. Most peoplethink of activities such as jogging or tennis as the way to fitness, but there are many ways you and your family can be more active. Anything that makes you breathe hard and gets your heart pumping is exercise. Here are sometips:   Walk to do errands or to take your child to school or the bus.  Go for a family bike ride after dinner instead of watching TV.  Where can you learn more?  Go toGoal Zerotps://[email or web address] and sign in to your [employer] account. Enter M535 in the Search HealthInformation box to learn more about \"A Healthy Lifestyle: Care Instructions. \"     If you do not have anaccount, please click on the \"Sign Up Now\" link.  Current as of: July 26, 2016   Content Version: 11.2   © 1596-2132 Shut Down. Care instructions adapted under license by [facility] ([facility]). If you have questions about a medical condition or this instruction, always ask your healthcare professional. [employer] disclaims any warranty or liability for your use of this information.

## 2020-11-03 PROBLEM — R42 DIZZINESS: Status: RESOLVED | Noted: 2019-06-18 | Resolved: 2020-11-03

## 2021-01-13 DIAGNOSIS — G20 PD (PARKINSON'S DISEASE) (HCC): ICD-10-CM

## 2021-01-13 RX ORDER — CARBIDOPA AND LEVODOPA 50; 200 MG/1; MG/1
TABLET, EXTENDED RELEASE ORAL
Qty: 60 TABLET | Refills: 5 | Status: SHIPPED | OUTPATIENT
Start: 2021-01-13 | End: 2021-05-26 | Stop reason: SDUPTHER

## 2021-01-13 NOTE — TELEPHONE ENCOUNTER
Last Appt:  10/8/2020  Next Appt:   4/15/2021  Med verified in [facility]

## 2021-05-26 ENCOUNTER — OFFICE VISIT (OUTPATIENT)
Dept: NEUROLOGY | Age: 76
End: 2021-05-26
Payer: MEDICARE

## 2021-05-26 VITALS
OXYGEN SATURATION: 96 % | HEIGHT: 59 IN | WEIGHT: 117.2 LBS | SYSTOLIC BLOOD PRESSURE: 98 MMHG | HEART RATE: 50 BPM | BODY MASS INDEX: 23.63 KG/M2 | DIASTOLIC BLOOD PRESSURE: 64 MMHG

## 2021-05-26 DIAGNOSIS — S09.90XS MILD CLOSED HEAD INJURY, SEQUELA: ICD-10-CM

## 2021-05-26 DIAGNOSIS — F34.1 DYSTHYMIC DISORDER: ICD-10-CM

## 2021-05-26 DIAGNOSIS — G47.9 DIFFICULTY SLEEPING: ICD-10-CM

## 2021-05-26 DIAGNOSIS — R29.6 EXCESSIVE FALLING: ICD-10-CM

## 2021-05-26 DIAGNOSIS — R26.9 GAIT DIFFICULTY: ICD-10-CM

## 2021-05-26 DIAGNOSIS — G72.9 MYOPATHY: ICD-10-CM

## 2021-05-26 DIAGNOSIS — G56.03 BILATERAL CARPAL TUNNEL SYNDROME: ICD-10-CM

## 2021-05-26 DIAGNOSIS — I65.29 CAROTID STENOSIS, ASYMPTOMATIC, UNSPECIFIED LATERALITY: ICD-10-CM

## 2021-05-26 DIAGNOSIS — F32.A ANXIETY AND DEPRESSION: ICD-10-CM

## 2021-05-26 DIAGNOSIS — G20 PD (PARKINSON'S DISEASE) (HCC): Primary | ICD-10-CM

## 2021-05-26 DIAGNOSIS — R41.3 MEMORY PROBLEM: ICD-10-CM

## 2021-05-26 DIAGNOSIS — E03.9 ACQUIRED HYPOTHYROIDISM: ICD-10-CM

## 2021-05-26 DIAGNOSIS — G44.229 CHRONIC TENSION-TYPE HEADACHE, NOT INTRACTABLE: ICD-10-CM

## 2021-05-26 DIAGNOSIS — F41.9 ANXIETY AND DEPRESSION: ICD-10-CM

## 2021-05-26 DIAGNOSIS — Z91.81 H/O FALLING: ICD-10-CM

## 2021-05-26 DIAGNOSIS — G63 POLYNEUROPATHY ASSOCIATED WITH UNDERLYING DISEASE (HCC): ICD-10-CM

## 2021-05-26 DIAGNOSIS — R26.9 ABNORMAL GAIT: ICD-10-CM

## 2021-05-26 DIAGNOSIS — R26.89 BALANCE PROBLEM: ICD-10-CM

## 2021-05-26 DIAGNOSIS — F02.80 DEMENTIA ASSOCIATED WITH OTHER UNDERLYING DISEASE WITHOUT BEHAVIORAL DISTURBANCE (HCC): ICD-10-CM

## 2021-05-26 DIAGNOSIS — I67.82 CHRONIC CEREBRAL ISCHEMIA: ICD-10-CM

## 2021-05-26 DIAGNOSIS — G56.23 ULNAR NEUROPATHY OF BOTH UPPER EXTREMITIES: ICD-10-CM

## 2021-05-26 DIAGNOSIS — R25.8 BRADYKINESIA: ICD-10-CM

## 2021-05-26 PROCEDURE — 99214 OFFICE O/P EST MOD 30 MIN: CPT | Performed by: PSYCHIATRY & NEUROLOGY

## 2021-05-26 RX ORDER — M-VIT,TX,IRON,MINS/CALC/FOLIC 27MG-0.4MG
1 TABLET ORAL DAILY
Qty: 30 TABLET | Refills: 5 | Status: SHIPPED | OUTPATIENT
Start: 2021-05-26

## 2021-05-26 RX ORDER — CARBIDOPA AND LEVODOPA 50; 200 MG/1; MG/1
TABLET, EXTENDED RELEASE ORAL
Qty: 60 TABLET | Refills: 5 | Status: SHIPPED | OUTPATIENT
Start: 2021-05-26

## 2021-05-26 ASSESSMENT — ENCOUNTER SYMPTOMS
COUGH: 0
SHORTNESS OF BREATH: 0
SORE THROAT: 0
CHANGE IN BOWEL HABIT: 0
PHOTOPHOBIA: 0
TROUBLE SWALLOWING: 0
SWOLLEN GLANDS: 0
VISUAL CHANGE: 0
FACIAL SWELLING: 0
BOWEL INCONTINENCE: 0
DIARRHEA: 0
EYE REDNESS: 0
EYE ITCHING: 0
ABDOMINAL DISTENTION: 0
CONSTIPATION: 0
ABDOMINAL PAIN: 0
CHOKING: 0
EYE PAIN: 0
WHEEZING: 0
BACK PAIN: 1
VOICE CHANGE: 0
NAUSEA: 0
BLOOD IN STOOL: 0
APNEA: 0
VOMITING: 0
COLOR CHANGE: 0
SINUS PRESSURE: 0
CHEST TIGHTNESS: 0
EYE DISCHARGE: 0

## 2021-05-26 NOTE — PROGRESS NOTES
[facility]  Neurology  [address] [address]  INSTL:[phone]   Fax: [phone]      SUBJECTIVE:       PATIENT ID:  Florencia Mccain is a  RIGHT     HANDED 70 y.o. female. Dizziness  This is a recurrent problem. Episode onset: JUNE 2019. The problem occurs intermittently. The problem has been resolved. Associated symptoms include fatigue, headaches, neck pain, vertigo and weakness. Pertinent negatives include no abdominal pain, anorexia, arthralgias, change in bowel habit, chest pain, chills, congestion, coughing, diaphoresis, fever, joint swelling, myalgias, nausea, rash, sore throat, swollen glands, urinary symptoms, visual change or vomiting. The symptoms are aggravated by standing, stress and walking. She has tried rest and sleep for the symptoms. The treatment provided significant relief. Neurologic Problem  The patient's primary symptoms include clumsiness, focal sensory loss, focal weakness, a loss of balance, memory loss, slurred speech and weakness. The patient's pertinent negatives include no altered mental status, near-syncope, syncope or visual change. Primary symptoms comment: H/O   RECURRENT  FALLING      GAIT  AND  BALANCE  PROBLES,   FATIGUE   . This is a chronic problem. Episode onset: MORE  THAN   2  YEARS. The neurological problem developed insidiously. The problem is unchanged. There was lower extremity, left-sided, right-sided and upper extremity focality noted. Associated symptoms include back pain, confusion, dizziness, fatigue, headaches, neck pain and vertigo. Pertinent negatives include no abdominal pain, auditory change, aura, bladder incontinence, bowel incontinence, chest pain, diaphoresis, fever, light-headedness, nausea, palpitations, shortness of breath or vomiting. Past treatments include nothing. The treatment provided no relief. Her past medical history is significant for dementia, head trauma and mood changes.  There is no history of a bleeding disorder, a clotting disorder, a CVA, liver disease or seizures. History obtained from  The patient  AND  HER    DAUGHTER        and other  available   medical  records   were  Also  reviewed. The  Duration,  Quality,  Severity,  Location,  Timing,  Context,  Modifying  Factors   Of   The   Chief   Complaint       And  Present  Illness   Was   Reviewed   In   Chronological   [facility]anner. PATIENT'S  MAIN  CONCERNS INCLUDE :                         1)     PREVIOUS      H/O       RECURRENT    FALLING      SINCE     2017                                  H/O    EXCESSIVE   FALLING    IN      MAY    2019                                                       2)    H/O     DIZZY     SPELLS     SINCE    June 2019                                           -     IMPROVED    AND  STABLE                                       TO  TAKE    ASA  81  MG  PO  DAILY                              3)     NO   H/O   SYNCOPE.                           NO   H/O   SEIZURE   ACTIVITY                                  4)     H/O     ER   VISIT       ON   6/06/ 2019                                          DUE  TO   MILD  HEAD INJURY  DUE  TO  FALL                                     CT    HEAD     AND  CT   CERVICAL  SPINE        SHOWED                                        NO  ACUTE  PATHOLOGY                             5)   MULTIPLE  CO MORBID  MEDICAL  CONDITIONS                                BEING  FOLLOWED  BY     HER  PCP.                                 6)   H/O   CHRONIC  GAIT  AND  BALANCE  PROBLEMS                                  PATIENT  USING  CANE /   WALKER       FOR     2   YEARS                              7)    H/O   CHRONIC  FATIGUE    AND                                   GENERALIZED   WEAKNESS                                   8)     H/O   [facility]  MEMORY  PROBLEMS        AND   DEMENTIA                                     FOR    3   YEARS -   NEEDS  MONITORING                                     B)    TCD    -   MODERATE  VERTEBROBASILAR  INSUFFICIENCY                                     C)    EEG   -    NO  EPILEPTIFORM  FEATURES                                                                MODERATE   CEREBRAL  DYSFUNCTION                          16)     EMG/ NC   STUDIES  IN  AUG. 2019      SHOWED                                   A)  MODERATE  CARPAL  TUNNEL  SYNDROME                                  B)   ULNAR  NEUROPATHY  BILATERALLY                                ADVISED  -      TO   WEAR  WRIST  BRACES                                                     TO  AVOID  PRESSURE  OVER  ULNAR  ASPECTS                                 C)     MODERATE  PERIPHERAL POLYNEUROPATHY                                D)    EARLY  MYOPATHY   BOTH  LOWER  EXTREMITIES                                          ADVISED   -    FALL  PRECAUTIONS                                              -     USE  WALKER  REGULARLY                                             -   PT/  OT                     17)             WITH  REFERENCE  TO PATIENT'S  DAUGHTER'S  QUESTIONS,                                    IN   THE  PAST,    THEY  WERE  INFORMED   THAT      LUMBAR  DISC  SURGERY                                  DOES     NOT       IMPROVE                                       HELP     HER  GAIT  AND  BALANCE PROBLEMS                      18)               EXPECTATIONS,   GOALS   OF   THERAPY   WITH  MEDICATIONS                                      AND  SIDE  EFFECTS  MEDICATIONS    WERE                                 REVIEWED     AND   DISCUSSED    IN    DETAIL.                                         PATIENT      ADVISED  TO CONTINUE   SINEMET    AND                                   SINEMET  CR        AS   BEFORE       AND    PARKINSON'S   DISEASE                                      PROCESS   IMPROVED   AND  STABLE 19)           PATIENT     RECOMMENDED   SUPERVISED      CARE,                                     PERIODIC     THERAPY       AND     ASSISTED  LIVING                                 -    DISCUSSED   WITH  PATIENT  AND  HER  DAUGHTER                                          MULTIPLE  TIMES                           20)      H/O   BRIEF     VISUAL  HALLUCINATIONS                                             -  PATIENT    NOT   CONCERNED                                 NO    RECURRENCE OF  THE     SAME.                                    -   NEEDS  MONITORING                          21)      PATIENT  AND  HER  DAUGHTER     ARE     NOT  INTERESTED                                   ANY    MEDICATION   FOR    DEMENTIA                                             22 )         VARIOUS  RISK   FACTORS   WERE  REVIEWED   AND   DISCUSSED. PATIENT   HAS  MULTIPLE   MEDICAL, NEUROLOGICAL                        AND   MENTAL HEALTH   PROBLEMS . PATIENT'S   MANAGEMENT  IS  CHALLENGING.                    23)         PATIENT  INDICATED    THAT   SHE  IS     DOING   BETTER. PATIENT     AND  HER  DAUGHTER    DENIES  ANY    NEW  NEUROLOGICAL  CONCERNS.                                           PRECIPITATING  FACTORS: including  fever/infection, exertion/relaxation, position change, stress, weather change,                        medications/alcohol, time of day/darkness/light  Are  absent                                                              MODIFYING  FACTORS:  fever/infection, exertion/relaxation, position change, stress, weather change,                        medications/alcohol, time of day/darkness/light  Are  absent             Patient   Indicates   The  Presence   And  The  Absence  Of  The  Following    Associated  And   Additional  Neurological    Symptoms:                                Balance  And coordination INTOLERANCES,     FAMILY   HISTORY,  SOCIAL  HISTORY,    PROBLEM  LIST   FOR  PATIENT  CARE   COORDINATION    Past Medical History:   Diagnosis Date    Abnormal gait     Adult BMI 27.0-27.9 kg/sq m     COPD (chronic obstructive pulmonary disease) (HCC)     Difficulty sleeping     Dizziness     Dysthymic disorder     Esophageal reflux     Excessive falling     HTN (hypertension)     Hyperlipidemia     Hypothyroidism     Osteoarthritis     Right hip pain     Vertigo          Past Surgical History:   Procedure Laterality Date    APPENDECTOMY       SECTION      CHOLECYSTECTOMY      FOOT SURGERY           Current Outpatient Medications   Medication Sig Dispense Refill    carbidopa-levodopa (SINEMET)  MG per tablet TAKE ONE TABLET BY MOUTH THREE TIMES DAILY WITH FOOD 90 tablet 0    carbidopa-levodopa (SINEMET CR)  MG per extended release tablet TAKE ONE TABLET BY MOUTH TWICE DAILY 60 tablet 5    levothyroxine (SYNTHROID) 50 MCG tablet Take 50 mcg by mouth Daily      atorvastatin (LIPITOR) 20 MG tablet Take 20 mg by mouth daily      metoprolol succinate (TOPROL XL) 25 MG extended release tablet Take 12.5 mg by mouth daily       polyethylene glycol (GLYCOLAX) packet Take 17 g by mouth 2 times daily      Multiple Vitamins-Minerals (CENTRUM/CERTA-ALINA WITH MINERALS ORAL) solution Take 15 mLs by mouth daily      Multiple Vitamins-Minerals (THERAPEUTIC MULTIVITAMIN-MINERALS) tablet Take 1 tablet by mouth daily      omeprazole (PRILOSEC) 20 MG delayed release capsule Take 40 mg by mouth daily      FLUoxetine (PROZAC) 20 MG capsule Take 40 mg by mouth daily      solifenacin (VESICARE) 10 MG tablet Take 10 mg by mouth daily      vitamin B-12 (CYANOCOBALAMIN) 100 MCG tablet Take 50 mcg by mouth daily Indications: 2 tabs      acetaminophen (TYLENOL) 325 MG tablet Take 650 mg by mouth every 4 hours as needed for Pain      Lactobacillus (ACIDOPHILUS PROBIOTIC PO) Take by mouth daily  aspirin 81 MG tablet Take 81 mg by mouth daily      pyridoxine (B6 NATURAL) 100 MG tablet Take 50 mg by mouth daily      calcium carbonate-vitamin D (CALCIUM 600 + D) 600-400 MG-UNIT TABS per tab Take 1 tablet by mouth daily      docusate sodium (COLACE) 100 MG capsule Take 100 mg by mouth 2 times daily      Ascorbic Acid (VITAMIN C) 100 MG tablet Take 100 mg by mouth daily      DULoxetine (CYMBALTA) 60 MG extended release capsule Take 60 mg by mouth 2 times daily      Cholecalciferol (D3-1000 PO) Take by mouth daily      FIBER COMPLETE PO Take by mouth daily      gabapentin (NEURONTIN) 300 MG capsule Take 300 mg by mouth 3 times daily.  senna (SENOKOT) 8.6 MG tablet Take 1 tablet by mouth daily      nitroGLYCERIN (NITROSTAT) 0.4 MG SL tablet Place 0.4 mg under the tongue every 5 minutes as needed for Chest pain up to max of 3 total doses. If no relief after 1 dose, call 911. (Patient not taking: Reported on 5/26/2021)      albuterol (PROVENTIL) (2.5 MG/3ML) 0.083% nebulizer solution Take 2.5 mg by nebulization every 6 hours as needed for Wheezing (Patient not taking: Reported on 5/26/2021)      fluticasone-vilanterol (BREO ELLIPTA) 100-25 MCG/INH AEPB inhaler Inhale into the lungs daily (Patient not taking: Reported on 5/26/2021)      diltiazem (CARDIZEM 12 HR) 120 MG extended release capsule Take 120 mg by mouth daily (Patient not taking: Reported on 5/26/2021)       No current facility-administered medications for this visit.          Allergies   Allergen Reactions    Fentanyl Other (See Comments)     Hallucinations     Atorvastatin     Pregabalin     Tramadol          Family History   Problem Relation Age of Onset    Stroke Mother          Social History     Socioeconomic History    Marital status: Single     Spouse name: Not on file    Number of children: Not on file    Years of education: Not on file    Highest education level: Not on file   Occupational History    Not on file Tobacco Use    Smoking status: Never Smoker    Smokeless tobacco: Never Used   Substance and Sexual Activity    Alcohol use: Not Currently    Drug use: Never    Sexual activity: Not on file   Other Topics Concern    Not on file   Social History Narrative    Not on file     Social Determinants of Health     Financial Resource Strain:     Difficulty of Paying Living Expenses:    Food Insecurity:     Worried About Running Out of Food in the Last Year:     [address] in the Last Year:    Transportation Needs:     Lack of Transportation (Medical):  Lack of Transportation (Non-Medical):    Physical Activity:     Days of Exercise per Week:     Minutes of Exercise per Session:    Stress:     Feeling of Stress :    Social Connections:     Frequency of Communication with Friends and Family:     Frequency of Social Gatherings with Friends and Family:     Attends [employer] Services:     Active Member of Clubs or Organizations:     Attends Club or Organization Meetings:     Marital Status:    Intimate Partner Violence:     Fear of Current or Ex-Partner:     Emotionally Abused:     Physically Abused:     Sexually Abused: There were no vitals filed for this visit.       Wt Readings from Last 3 Encounters:   05/26/21 117 lb 3.2 oz (53.2 kg)   10/08/20 120 lb (54.4 kg)   06/03/20 125 lb 14.1 oz (57.1 kg)         BP Readings from Last 3 Encounters:   10/08/20 116/64   06/03/20 118/66   01/14/20 138/68       Hematology and Coagulation  Lab Results   Component Value Date    WBC 6.4 05/10/2019    RBC 4.65 05/10/2019    HGB 14.0 05/10/2019    HCT 43.6 05/10/2019    MCV 93.8 05/10/2019    MCH 30.1 05/10/2019    MCHC 32.1 05/10/2019    RDW 13.6 05/10/2019    PLT See Reflexed IPF Result 05/10/2019    MPV NOT REPORTED 05/10/2019     No results found for: ESR    Chemistries  Lab Results   Component Value Date     05/10/2019    K 5.1 05/10/2019    CL 97 05/10/2019    CO2 25 05/10/2019    BUN 11 DECREASED               Muscle  Tone  In upper  And  Lower  Extremities  normal                No rigidity. No  Spasticity. Bradykinesia   PRESENT                  No  Asterixis. Sustention  Tremor , Resting   Tremor   absent                    No   other  Abnormal  Movements noted           D) SENSORY :               Light   touch, pinprick,   position  And  Vibration   DECREASED  IN  GLOVE  AND  STOCKING  MANNER        E) REFLEXES:                   Deep  Tendon  Reflexes   DECRESED                  No  pathological  Reflexes  Bilaterally.                                   F) COORDINATION  AND  GAIT :                                Station and  Gait  SLOW  UNSTEADY                             Romberg 's test   POSITIVE                            Ataxia negative      ASSESSMENT:    Patient Active Problem List   Diagnosis    Vertigo    H/O falling    Mild closed head injury    Osteoarthritis    Hypothyroidism    Hyperlipidemia    Excessive falling    Esophageal reflux    Dysthymic disorder    Difficulty sleeping    COPD [facility]chronic obstructive pulmonary disease) ([facility])    Adult BMI 27.0-27.9 kg/sq m    Abnormal gait    Polyneuropathy associated with underlying disease ([address]    Chronic fatigue    Gait difficulty    Balance problem    Bradykinesia    Memory problem    PD (Parkinson's disease) ([address]    Anxiety and depression    Chronic tension-type headache, not intractable    Carotid stenosis, asymptomatic, unspecified laterality    VBI (vertebrobasilar insufficiency)    Chronic cerebral ischemia    Bilateral carpal tunnel syndrome    Ulnar neuropathy of both upper extremities    Peroneal neuropathy    Myopathy    Chronic bilateral low back pain without sciatica           ULTRASOUND EVALUATION OF THE CAROTID ARTERIES       7/3/2019       COMPARISON:   None.       HISTORY:   ORDERING SYSTEM PROVIDED HISTORY: Vertigo   TECHNOLOGIST PROVIDED HISTORY:   dizziness     FINDINGS:       RIGHT:       The right common carotid artery demonstrates peak systolic velocities of 50   and 45 cm/sec in the proximal and distal segments respectively.       The right internal carotid artery demonstrates the systolic velocities of 51,   74, and 93 cm/sec in the proximal, mid and distal segments respectively.       The external carotid artery is patent.  The vertebral artery demonstrates   normal antegrade flow.       Heterogeneous hard plaque at the bifurcation with measured stenosis of up to   53% in the proximal internal carotid artery without corresponding flow   velocity acceleration.       ICA/CCA ratio of 1.0.           LEFT:       The left common carotid artery demonstrates peak systolic velocities of 71   and 46 cm/sec in the proximal and distal segments respectively.       The left internal carotid artery demonstrates the systolic velocities of 39,   53, and 98 cm/sec in the proximal, mid and distal segments respectively.       The external carotid artery is patent.  The vertebral artery demonstrates   normal antegrade flow.       Minimal plaque at the bifurcation.       ICA/CCA ratio of 0.5.           Impression   The right internal carotid artery demonstrates 0-50% stenosis by velocity   criteria.  Plaque at the bifurcation with measured stenosis of up to 53% in   the proximal internal carotid artery without corresponding flow velocity   acceleration.       The left internal carotid artery demonstrates 0-50% stenosis .       Bilateral vertebral arteries are patent with flow in the normal direction.                 VISITING DIAGNOSIS:      ICD-10-CM    1. PD (Parkinson's disease) ([facility]  G20    2. Chronic cerebral ischemia  I67.82    3. Chronic tension-type headache, not intractable  G44.229    4. Myopathy  G72.9    5. H/O falling  Z91.81    6. Acquired hypothyroidism  E03.9    7. Mild closed head injury, sequela  S09.90XS    8.  Carotid stenosis, asymptomatic, unspecified laterality I65.29    9. Polyneuropathy associated with underlying disease ([address]  G63    10. Bilateral carpal tunnel syndrome  G56.03    11. Ulnar neuropathy of both upper extremities  G56.23    12. Excessive falling  R29.6    13. Difficulty sleeping  G47.9    14. Abnormal gait  R26.9    15. Balance problem  R26.89    16. Anxiety and depression  F41.9     F32.9    17. Memory problem  R41.3    18. Gait difficulty  R26.9    19. Dysthymic disorder  F34.1    20. Bradykinesia  R25.8               CONCERNS   &   INCREASED   RISK   FOR         * TIA,  CEREBRO  VASCULAR  ISCHEMIA,STROKE     *   DIZZINESS,   VERTEBROBASILAR  INSUFFICIENCY ,       *   ORTHOSTATIC  INTOLERANCE,    AUTONOMIC  NEUROPATHY       *     CHRONIC  TENSION  HEADACHES      *   COGNITIVE  &   MEMORY PROBLEMS  AND  DEMENTIAS       *   PERIPHERAL  NEUROPATHY,       *  PARKINSON'S  DISEASE        *  GAIT  DIFFICULTIES  &   BALANCE PROBLMES   AND  FALL                VARIOUS  RISK   FACTORS   WERE  REVIEWED   AND   DISCUSSED. *  PATIENT   HAS  MULTIPLE   MEDICAL, NEUROLOGICAL                        AND   MENTAL HEALTH   PROBLEMS . PATIENT'S   MANAGEMENT  IS  CHALLENGING. PLAN:                     Nazia Pacheco  Of  The  Diagnoses,  The  Management & Treatment  Options            AND    Care  plan  Were          Reviewed and   Discussed   With  patient. * Goals  And  Expectations  Of  The  Therapy  Discussed   And  Reviewed. *   Benefits   And   Side  Effect  Profile  Of  Medication/s   Were   Discussed                * Need   For  Further   Follow up For  The  Various  Problems Were  discussed. * Results  Of  The  Previous  Diagnostic tests were reviewed and  discussed                 Medical  Decision  Making  Was  Made  Based on the   Complexity  Of  Above  Mentioned  Diagnoses,    Data reviewed             And    Risk  Of  Significant   Co morbidities and   complicating   Factors.                  Medical  Decision  Was High    Complexity   Due   To  The  Patient's  Multiple  Symptoms,            Advancing   Disease,  Complex  Treatment  Regimen,  Multiple medications           and   Risk  Of   Side  Effects,  Difficulty  In  Medication  Management  And  Diagnostic  Challenges           In  View  Of  The  Associated   Co  Morbid  Conditions   And  Problems. * FALL   PRECAUTIONS. THESE  REVIEWED   AND  DISCUSSED    *  USE   WALKING  ASSISTANCE  DEVICES     QUAD  CANE  /   WALKER          *    SUPERVISED   CARE    *   ABSOLUTELY   NO  DRIVING      *   BE  CAREFUL  WITH  ACTIVITIES            *   AVOID   NECK  AND/ BACK  STRAINING  ACTIVITIES          *   ADEQUATE   FLUIDINTAKE   AND  ELECTROLYTE  BALANCE             * KEEP  DAIRY  OF   THE  NEUROLOGICAL  SYMPTOMS        RECORDING THE    DURATION  AND  FREQUENCY. *  AVOID    CONDITIONS  AND  FACTORS   THAT  MAKE                  NEUROLOGICAL  SYMPTOMS  WORSE.                          *TO  MAINTAIN  REGULAR  SLEEP  WAKE  CYCLES. *   TO  HAVE  ADEQUATE  REST  AND   SLEEP    HOURS. *      WEIGHT   LOSS. *    AVOID  ANY USAGE OF    TOBACCO,              EXCESSIVE  ALCOHOL  AND   ILLEGAL   SUBSTANCES              *  CONTINUE   MEDICATIONS    PRESCRIBED   BY NEUROLOGIST                           AS    RECOMMENDED         *   Compliance   With  Medications   And  Instructions            *    MIGRAINE/ HEADACHE    DAIRY   WITH  MONITORING                       OF  DURATION  AND  FREQUENCY.           *  May   Use  Pill  Box,    If  Needed          *    Antiplatelet  therapy    As   Recommended  Was   Discussed          *    Prophylactic  Use   Of     Vitamin   B   Complex,  Folic  Acid,    Vitamin  B12    Multivitamin,       Calcium  With  magnesium  And  Vit D    Supplementations   Over  The  Counter  Discussed               *  PATIENT  IS  ALSO   COUNSELED   TO  KEEP    ACTIVITIES:           A)   SIMPLE      B)  ORGANIZED      C) WRITEDOWN                       *  EVALUATIONS  AND  FOLLOW UP:                  * PHYSICAL  THERAPY               *    OCCUPATIONAL THERAPY   / SPEECH  THERAPY                               *CARDIOLOGY                              *      PATIENT     STARTED  ON  SINEMET      IN   June 2019                            WITH   SIGNIFICANT  IMPROVEMENT  IN   PARKINSONIAN  FEATURES                              NO   RECURRENCE  OF  FALLING. PATIENT  TOLERATING  THE  SINEMET                          *     EMG/ NC   STUDIES  IN  AUG. 2019      SHOWED                                   A)  MODERATE  CARPAL  TUNNEL  SYNDROME                                  B)   ULNAR  NEUROPATHY  BILATERALLY                    *          ADVISED  -      TO   WEAR  WRIST  BRACES                                                     TO  AVOID  PRESSURE  OVER  ULNAR  ASPECTS                                 C)     MODERATE  PERIPHERAL POLYNEUROPATHY                                D)    EARLY  MYOPATHY   BOTH  LOWER  EXTREMITIES                                 *       ADVISED   -    FALL  PRECAUTIONS                                              -     USE  WALKER  REGULARLY                                             -   PT/  OT                           *             PATIENT      ADVISED  TO CONTINUE   SINEMET    AND                                   SINEMET  CR        AS   BEFORE                                          *            EXPECTATIONS,   GOALS   OF   THERAPY   WITH  MEDICATIONS                                      AND  SIDE  EFFECTS  MEDICATION    WERE                                 REVIEWED     AND   DISCUSSED    IN    DETAIL.                                 *          PATIENT     RECOMMENDED   SUPERVISED      CARE,                                     PERIODIC     THERAPY       AND     ASSISTED  LIVING                                 -    DISCUSSED   WITH  PATIENT  AND  HER  DAUGHTER *           PATIENT  INDICATED    THAT   SHE  IS     DOING   BETTER. PATIENT     AND  HER  DAUGHTER    DENIES  ANY    NEW  NEUROLOGICAL  CONCERNS. Orders Placed This Encounter   Medications    carbidopa-levodopa (SINEMET)  MG per tablet     Sig: TAKE ONE TABLET BY MOUTH THREE TIMES DAILY WITH FOOD     Dispense:  90 tablet     Refill:  5    carbidopa-levodopa (SINEMET CR)  MG per extended release tablet     Sig: TAKE ONE TABLET BY MOUTH TWICE DAILY     Dispense:  60 tablet     Refill:  5     This prescription was filled on 1/13/2021. Any refills authorized will be placed on file.  Multiple Vitamins-Minerals (THERAPEUTIC MULTIVITAMIN-MINERALS) tablet     Sig: Take 1 tablet by mouth daily     Dispense:  30 tablet     Refill:  5                       *PATIENT   TO  FOLLOW  UP  WITH   PRIMARY  CARE         OTHER  CONSULTANTS  AS  BEFORE.               *TO  FOLLOW  WITH   MENTAL  HEALTH  PROFESSIONALS ,  INCLUDING            PSYCHOLOGICAL  COUNSELING   AND  PSYCHIATRIC  EVALUTIONS,                     *  Maintain   Healthy  Life Style    With   Periodic  Monitoring  Of      Any  Medical  Conditions  Including   Elevated  Blood  Pressure,  Lipid  Profile,     Blood  Sugar levels  AndHeart  Disease. *   Period   Screening  For  Cancers  Involving  Breast,  Colon,    lungs  And  Other  Organs  As  Applicable,  In consultation   With  Your  Primary Care Providers. *Second  Neurological  Opinion  And  Evaluations  In  [facility]  Setting  If  Patient  Is  Interested. * Please   Contact   Neurology  Clinic   Early   If   Are  Any  New  Neurological   And  Any neurological  Concerns.                   *  If  The  Patient remains  Neurologically  Stable   Return   To  [facility] Neurology Department   IN    3 - 6     MONTHS  TIME   FOR  FURTHER FOLLOW UP.                       *   The  Neurological   Findings,  Possible  Diagnosis,  Differential diagnoses                    And  Options  For    Further   Investigations                   And  management   Are  Discussed  Comprehensively. Medications   And  Prescription   Risks  And  Side effects  Are   Also  Discussed. *  If   There is  Any  Significant  Worsening   Of  Current  Symptoms  And  Or                  If patient  Develops   Any additional  New  NeurologicalSymptoms                  Or  Significant  Concerns   Should  Call  911 or  Go  To  Emergency  Department  For  Further  Immediate  Evaluation. The   Above  Were  Reviewed  With  Patient   AND  HER  FAMILY         and                       questions  Answered  In  Detail. More   Than  50% of face  To face Time   Was  Spent  On  Counseling                    And   Coordination  Of  Care   Of   Patient's  multiple   Neurological  Problems                         And   Comorbid  Medical   Conditions. Electronically signed by Graciela Ledezma MD.,  [facility]      Board Certified in  Neurology &  In  [facility] ([employer])      DISCLAIMER:   Although every effort was made to ensure the accuracy of this  electronictranscription, some errors in transcription may have occurred. GENERAL PATIENT INSTRUCTIONS:      A Healthy Lifestyle: Care Instructions   Your Care Instructions   A healthy lifestyle can help you feel good, stay at ahealthy weight, and have plenty of energy for both work and play. A healthy lifestyle is something you can share with your whole family.  A healthy lifestyle also can lower your risk for serious health problems, such ashigh blood pressure, heart disease, and diabetes.    You can follow a few steps listed below to improve your health and the health of your family.  Follow-up careis a key part of your treatment and safety. Be sure to make and go to all appointments, and call your doctor if you are having problems. Its also a good idea to know your test results and keep a list of the medicines you take.  How can you care for yourself at home?  Do not eat too much sugar, fat, or fast foods. You can still have dessert and treats nowand then. The goal is moderation.  Start small to improve your eating habits. Pay attention to portion sizes, drink less juice and soda pop, and eat more fruits and vegetables.  Eat a healthy amount of food. A 3-ounce serving of meat, for example, is about the size of a deck of cards. Fill the rest of your plate with vegetables and whole grains.  Limit theamount of soda and sports drinks you have every day. Drink more water when you are thirsty.  Eat at least 5 servings of fruits and vegetables every day. It may seem like a lot, but it is not hard to reach this goal. Aserving or helping is 1 piece of fruit, 1 cup of vegetables, or 2 cups of leafy, raw vegetables. Have an apple or some carrot sticks as an afternoon snack instead of a candy bar. Try to have fruits and/or vegetables at everymeal.   Make exercise part of your daily routine. You may want to start with simple activities, such as walking, bicycling, or slow swimming. Try mely active 30 to 60 minutes every day. You do not need to do all 30 to 60 minutes all at once. For example, you can exercise 3 times a day for 10 or 20 minutes. Moderate exercise is safe for most people, but it is always agood idea to talk to your doctor before starting an exercise program.   Keep moving. Amelia Clas the lawn, work in the garden, or [employer]. Take the stairs instead of the elevator at work.  If you smoke, quit. Peoplewho smoke have an increased risk for heart attack, stroke, cancer, and other lung illnesses.  Quitting is hard, but there are ways to boost your chance of quitting tobacco for good.  Use nicotine gum, patches, or lozenges.  Ask your doctor about stop-smoking programs and medicines.  Keep trying.  In addition to reducing your risk of diseases in the future, you will notice some benefits soon after you stop using tobacco. If you have shortness of breath or asthma symptoms, they will likely getbetter within a few weeks after you quit.  Limit how much alcohol you drink. Moderate amounts of alcohol (up to 2 drinks a day for men, 1drink a day for women) are okay. But drinking too much can lead to liver problems, high blood pressure, and other health problems.  health   If you have a family, there are many things you can do together to improve your health.  Eat meals together as a family as often as possible.  Eat healthy foods. This includes fruits, vegetables, lean meats and dairy, and whole grains.  Include your family in your fitness plan. Most peoplethink of activities such as jogging or tennis as the way to fitness, but there are many ways you and your family can be more active. Anything that makes you breathe hard and gets your heart pumping is exercise. Here are sometips:   Walk to do errands or to take your child to school or the bus.  Go for a family bike ride after dinner instead of watching TV.  Where can you learn more?  Go toSplash Technologytps://StayNTouchpeSEAT 4a.[employer] ([employer]). org and sign in to your [employer] account. Enter M972 in the Search HealthInformation box to learn more about \"A Healthy Lifestyle: Care Instructions. \"     If you do not have anaccount, please click on the \"Sign Up Now\" link.  Current as of: July 26, 2016   Content Version: 11.2   © 6955-4783 [employer]. Care instructions adapted under license by [facility] [place] ([facility]). If you have questions about a medical condition or this instruction, always ask your healthcare professional. [employer] disclaims any warranty or liability for your use of this information.

## 2021-08-04 ENCOUNTER — OFFICE VISIT (OUTPATIENT)
Dept: NEUROLOGY | Age: 76
End: 2021-08-04
Payer: MEDICARE

## 2021-08-04 VITALS
HEART RATE: 68 BPM | HEIGHT: 59 IN | SYSTOLIC BLOOD PRESSURE: 124 MMHG | WEIGHT: 111.2 LBS | OXYGEN SATURATION: 95 % | BODY MASS INDEX: 22.42 KG/M2 | DIASTOLIC BLOOD PRESSURE: 76 MMHG

## 2021-08-04 DIAGNOSIS — G63 POLYNEUROPATHY ASSOCIATED WITH UNDERLYING DISEASE (HCC): ICD-10-CM

## 2021-08-04 DIAGNOSIS — G20 PD (PARKINSON'S DISEASE) (HCC): Primary | ICD-10-CM

## 2021-08-04 DIAGNOSIS — G44.229 CHRONIC TENSION-TYPE HEADACHE, NOT INTRACTABLE: ICD-10-CM

## 2021-08-04 DIAGNOSIS — G24.9 DYSKINESIA: ICD-10-CM

## 2021-08-04 DIAGNOSIS — G72.9 MYOPATHY: ICD-10-CM

## 2021-08-04 DIAGNOSIS — R26.9 GAIT DIFFICULTY: ICD-10-CM

## 2021-08-04 DIAGNOSIS — S09.90XS MILD CLOSED HEAD INJURY, SEQUELA: ICD-10-CM

## 2021-08-04 DIAGNOSIS — G89.29 CHRONIC BILATERAL LOW BACK PAIN WITHOUT SCIATICA: ICD-10-CM

## 2021-08-04 DIAGNOSIS — E03.9 ACQUIRED HYPOTHYROIDISM: ICD-10-CM

## 2021-08-04 DIAGNOSIS — G47.9 DIFFICULTY SLEEPING: ICD-10-CM

## 2021-08-04 DIAGNOSIS — M54.50 CHRONIC BILATERAL LOW BACK PAIN WITHOUT SCIATICA: ICD-10-CM

## 2021-08-04 DIAGNOSIS — I67.82 CHRONIC CEREBRAL ISCHEMIA: ICD-10-CM

## 2021-08-04 DIAGNOSIS — F34.1 DYSTHYMIC DISORDER: ICD-10-CM

## 2021-08-04 DIAGNOSIS — G45.0 VBI (VERTEBROBASILAR INSUFFICIENCY): ICD-10-CM

## 2021-08-04 DIAGNOSIS — R42 DIZZINESS: ICD-10-CM

## 2021-08-04 DIAGNOSIS — R26.89 BALANCE PROBLEM: ICD-10-CM

## 2021-08-04 DIAGNOSIS — Z91.81 H/O FALLING: ICD-10-CM

## 2021-08-04 DIAGNOSIS — F32.A ANXIETY AND DEPRESSION: ICD-10-CM

## 2021-08-04 DIAGNOSIS — I65.29 CAROTID STENOSIS, ASYMPTOMATIC, UNSPECIFIED LATERALITY: ICD-10-CM

## 2021-08-04 DIAGNOSIS — F41.9 ANXIETY AND DEPRESSION: ICD-10-CM

## 2021-08-04 DIAGNOSIS — F02.80 DEMENTIA ASSOCIATED WITH OTHER UNDERLYING DISEASE WITHOUT BEHAVIORAL DISTURBANCE (HCC): ICD-10-CM

## 2021-08-04 DIAGNOSIS — G56.23 ULNAR NEUROPATHY OF BOTH UPPER EXTREMITIES: ICD-10-CM

## 2021-08-04 DIAGNOSIS — R41.3 MEMORY PROBLEM: ICD-10-CM

## 2021-08-04 DIAGNOSIS — R25.8 BRADYKINESIA: ICD-10-CM

## 2021-08-04 DIAGNOSIS — R26.9 ABNORMAL GAIT: ICD-10-CM

## 2021-08-04 DIAGNOSIS — G56.03 BILATERAL CARPAL TUNNEL SYNDROME: ICD-10-CM

## 2021-08-04 PROCEDURE — 99214 OFFICE O/P EST MOD 30 MIN: CPT | Performed by: PSYCHIATRY & NEUROLOGY

## 2021-08-04 RX ORDER — CHLORAL HYDRATE 500 MG
1 CAPSULE ORAL DAILY
COMMUNITY

## 2021-08-04 ASSESSMENT — ENCOUNTER SYMPTOMS
FACIAL SWELLING: 0
SORE THROAT: 0
DIARRHEA: 0
SWOLLEN GLANDS: 0
SHORTNESS OF BREATH: 0
EYE PAIN: 0
EYE REDNESS: 0
COLOR CHANGE: 0
WHEEZING: 0
BOWEL INCONTINENCE: 0
ABDOMINAL DISTENTION: 0
SINUS PRESSURE: 0
EYE DISCHARGE: 0
COUGH: 0
EYE ITCHING: 0
TROUBLE SWALLOWING: 0
VISUAL CHANGE: 0
CHANGE IN BOWEL HABIT: 0
NAUSEA: 0
CHOKING: 0
VOICE CHANGE: 0
APNEA: 0
BLOOD IN STOOL: 0
CONSTIPATION: 0
CHEST TIGHTNESS: 0
ABDOMINAL PAIN: 0
BACK PAIN: 1
VOMITING: 0
PHOTOPHOBIA: 0

## 2021-08-04 NOTE — PROGRESS NOTES
[facility]  Neurology  [address]:[phone]   Fax: [phone]      SUBJECTIVE:       PATIENT ID:  Mell Rogers is a  RIGHT     HANDED 70 y.o. female. Dizziness  This is a recurrent problem. Episode onset: JUNE 2019. The problem occurs intermittently. The problem has been resolved. Associated symptoms include fatigue, headaches, neck pain, vertigo and weakness. Pertinent negatives include no abdominal pain, anorexia, arthralgias, change in bowel habit, chest pain, chills, congestion, coughing, diaphoresis, fever, joint swelling, myalgias, nausea, rash, sore throat, swollen glands, urinary symptoms, visual change or vomiting. The symptoms are aggravated by standing, stress and walking. She has tried rest and sleep for the symptoms. The treatment provided significant relief. Neurologic Problem  The patient's primary symptoms include clumsiness, focal sensory loss, focal weakness, a loss of balance, memory loss, slurred speech and weakness. The patient's pertinent negatives include no altered mental status, near-syncope, syncope or visual change. Primary symptoms comment: H/O   RECURRENT  FALLING      GAIT  AND  BALANCE  PROBLES,   FATIGUE   . This is a chronic problem. Episode onset: MORE  THAN   2  YEARS. The neurological problem developed insidiously. The problem is unchanged. There was lower extremity, left-sided, right-sided and upper extremity focality noted. Associated symptoms include back pain, confusion, dizziness, fatigue, headaches, neck pain and vertigo. Pertinent negatives include no abdominal pain, auditory change, aura, bladder incontinence, bowel incontinence, chest pain, diaphoresis, fever, light-headedness, nausea, palpitations, shortness of breath or vomiting. Past treatments include nothing. The treatment provided no relief. Her past medical history is significant for dementia, head trauma and mood changes.  There is no history of a bleeding disorder, a clotting disorder, a CVA, liver disease or seizures. History obtained from  The patient  AND  HER    DAUGHTER        and other  available   medical  records   were  Also  reviewed. The  Duration,  Quality,  Severity,  Location,  Timing,  Context,  Modifying  Factors   Of   The   Chief   Complaint       And  Present  Illness   Was   Reviewed   In   Chronological   Manner. PATIENT'S  MAIN  CONCERNS INCLUDE :                         1)     PREVIOUS      H/O       RECURRENT    FALLING      SINCE     2017                                       H/O    EXCESSIVE   FALLING    IN      MAY    2019                                                       2)    H/O     DIZZY     SPELLS     SINCE    June 2019                                           -     IMPROVED    AND  STABLE                                       TO  TAKE    ASA  81  MG  PO  DAILY                              3)     NO   H/O   SYNCOPE.                           NO   H/O   SEIZURE   ACTIVITY                                  4)     H/O     ER   VISIT       ON   6/06/ 2019                                          DUE  TO   MILD  HEAD INJURY  DUE  TO  FALL                                     CT    HEAD     AND  CT   CERVICAL  SPINE        SHOWED                                        NO  ACUTE  PATHOLOGY                             5)   MULTIPLE  CO MORBID  MEDICAL  CONDITIONS                                BEING  FOLLOWED  BY     HER  PCP.                                 6)   H/O   CHRONIC  GAIT  AND  BALANCE  PROBLEMS                                  PATIENT  USING  CANE /   WALKER       FOR     2   YEARS                              7)    H/O   CHRONIC  FATIGUE    AND                                   GENERALIZED   WEAKNESS                                   8)     H/O   [facility]  MEMORY  PROBLEMS        AND   DEMENTIA                                     FOR    3 YEARS                          -  MAY  BENEFIT  FROM  MEDICATION   IF                                   SHE IS  WILLING    FOR  THE  SAME. PATIENT    AND   HER  DAUGHTER  NOT  INTERESTED                                    IN   MEDICATION  FOR  THE  SAME. 9)   H/O  CHRONIC    MILD  ANXIETY ,   DEPRESSION                                        FOR     3  YEARS                            PATIENT  TO  FOLLOW  WITH  MENTAL  HEALTH    PROFESSIONALS                        10)     SENSORY  PERIPHERAL  POLYNEUROPATHY                                        -     STABLE                                            11)     BRADYKINESIA,   SPEECH  DIFFICULTY    FALLING                                 PROGRESSIVE     IN  NATURE      SINCE     2018                                    -    MODERATE    PARKINSON'S  DISEASE                                                                     12)     H/O    CHRONIC  TENSION  HEADACHE                                        SINCE    JAN. 2019                         13)           PATIENT    LIVES  BY   HER SELF. PATIENT  DOES  NOT  DRIVE . PATIENT  GETTING      HELP    FROM   VISITING  NURSE                                      AS PER  HER  DAUGHTER                                              14)     PATIENT     STARTED  ON  SINEMET      IN  June 2019                          WITH   SIGNIFICANT  IMPROVEMENT  IN   PARKINSONIAN  FEATURES                                NO   RECURRENCE  OF  FALLING. PATIENT  TOLERATING  THE  SINEMET       AND    GETTING  BENEFIT                                        15)      HAD    NEURO  DIAGNOSTIC  EVALUATIONS   IN    AUG.   2019                                     A)   CAROTID  DOPPLER    -   ASYMPTOMATIC    STENOSIS                                                          ALREADY  ON  ASA  DAILY -   NEEDS  MONITORING                                     B)    TCD    -   MODERATE  VERTEBROBASILAR  INSUFFICIENCY                                     C)    EEG   -    NO  EPILEPTIFORM  FEATURES                                                                MODERATE   CEREBRAL  DYSFUNCTION                          16)     EMG/ NC   STUDIES  IN  AUG. 2019      SHOWED                                   A)  MODERATE  CARPAL  TUNNEL  SYNDROME                                  B)   ULNAR  NEUROPATHY  BILATERALLY                                ADVISED  -      TO   WEAR  WRIST  BRACES                                                     TO  AVOID  PRESSURE  OVER  ULNAR  ASPECTS                                 C)     MODERATE  PERIPHERAL POLYNEUROPATHY                                D)    EARLY  MYOPATHY   BOTH  LOWER  EXTREMITIES                                          ADVISED   -    FALL  PRECAUTIONS                                              -     USE  WALKER  REGULARLY                                             -   PT/  OT                     17)             WITH  REFERENCE  TO PATIENT'S  DAUGHTER'S  QUESTIONS,                                    IN   THE  PAST,    THEY  WERE  INFORMED   THAT      LUMBAR  DISC  SURGERY                                  DOES     NOT       IMPROVE   HER   GAIT  AND  BALANCE PROBLEMS                                       DUE   TO  PARKINSON'S    DISEASE                        18)               EXPECTATIONS,   GOALS   OF   THERAPY   WITH  MEDICATIONS                                      AND  SIDE  EFFECTS  MEDICATIONS    WERE                                 REVIEWED     AND   DISCUSSED    IN    DETAIL.                           19)           PATIENT     RECOMMENDED   SUPERVISED      CARE,                                     PERIODIC     THERAPY       AND     ASSISTED  LIVING                                 -    DISCUSSED   WITH PATIENT  AND  HER  DAUGHTER                                          MULTIPLE  TIMES                           20)      H/O   BRIEF     VISUAL  HALLUCINATIONS                                             -  PATIENT    NOT   CONCERNED                                 NO    RECURRENCE OF  THE     SAME.                                    -   NEEDS  MONITORING                          21)      PATIENT  AND  HER  DAUGHTER     ARE     NOT  INTERESTED                                   ANY    MEDICATION   FOR    DEMENTIA                          22)     H/O     ABNORMAL  MOVEMENTS  OF    TONGUE                          HUMMING,      RUBBING   HANDS    INTERMITTENTLY    SINCE    July 2021                                                         -    DISKINESIA                                  NO     H/O    RECENT    MENTAL  HEALTH    MEDICATIONS                                PATIENT  ADVISED      TO   STOP   SINEMET     TID                                         TO  CONTINUE   SINEMET    CR   BID     AS   BEFORE                             -    DISCUSSED    WITH   PATIENT'S   DAUGHTER  IN   DETAIL                                                       23 )         VARIOUS  RISK   FACTORS   WERE  REVIEWED   AND   DISCUSSED. PATIENT   HAS  MULTIPLE   MEDICAL, NEUROLOGICAL                        AND   MENTAL HEALTH   PROBLEMS . PATIENT'S   MANAGEMENT  IS  CHALLENGING.                                         PRECIPITATING  FACTORS: including  fever/infection, exertion/relaxation, position change, stress, weather change,                        medications/alcohol, time of day/darkness/light  Are  absent                                                              MODIFYING  FACTORS:  fever/infection, exertion/relaxation, position change, stress, weather change,                        medications/alcohol, time of day/darkness/light  Are  absent             Patient   Indicates   The Presence   And  The  Absence  Of  The  Following    Associated  And   Additional  Neurological    Symptoms:                                Balance  And coordination   problems  present           Gait problems     present            Headaches      absent              Migraines           absent           Memory problemspresent              Confusion        present            Paresthesia   numbness          present           Seizures  And  Starring  Episodes           absent           Syncope,  Near  syncopal episodes         absent           Speech   problems           absent             Swallowing   Problems      absent            Dizziness,  Light headedness           present              Vertigo        present             Generalized   Weakness    present              focal  Weakness     absent             Tremors         absent              Sleep  Problems     present             History  Of   Recent  Head  Injury     present             History  Of   Recent  TIA     absent             History  Of   Recent    Stroke     absent             Neck  Pain   and   Neck muscle  Spasms  present               Radiating  down   And   Weakness           absent            Lower back   Pain  And     Spasms  absent              Radiating    Down   And   Weakness          absent                H/OFALLS        present               History  Of   Visual  Symptoms    absent                  Associated   Diplopia       absent                                               Also   Additional   Symptoms   Present    As  Documented    In   The   detailed                  Review  Of  Systems   And    Please   Refer   To    Them for   Additional    Information. Any components  That are either  Unobtainable  Or  Limited  In   HPI, ROS  And/or PFSH   Are                   Due   ToPatient's  Medical  Problems,  Clinical  Condition   and/or lack of                                other    Alternate   resources.           RECORDS Atorvastatin     Pregabalin     Tramadol          Family History   Problem Relation Age of Onset    Stroke Mother          Social History     Socioeconomic History    Marital status: Single     Spouse name: Not on file    Number of children: Not on file    Years of education: Not on file    Highest education level: Not on file   Occupational History    Not on file   Tobacco Use    Smoking status: Never Smoker    Smokeless tobacco: Never Used   Substance and Sexual Activity    Alcohol use: Not Currently    Drug use: Never    Sexual activity: Not on file   Other Topics Concern    Not on file   Social History Narrative    Not on file     Social Determinants of Health     Financial Resource Strain:     Difficulty of Paying Living Expenses:    Food Insecurity:     Worried About Running Out of Food in the Last Year:     [address] in the Last Year:    Transportation Needs:     Lack of Transportation (Medical):      Lack of Transportation (Non-Medical):    Physical Activity:     Days of Exercise per Week:     Minutes of Exercise per Session:    Stress:     Feeling of Stress :    Social Connections:     Frequency of Communication with Friends and Family:     Frequency of Social Gatherings with Friends and Family:     Attends Rastafari Services:     Active Member of Clubs or Organizations:     Attends Club or Organization Meetings:     Marital Status:    Intimate Partner Violence:     Fear of Current or Ex-Partner:     Emotionally Abused:     Physically Abused:     Sexually Abused:        Vitals:    08/04/21 1022   BP: 124/76   Pulse: 68   SpO2: 95%         Wt Readings from Last 3 Encounters:   08/04/21 111 lb 3.2 oz (50.4 kg)   05/26/21 117 lb 3.2 oz (53.2 kg)   10/08/20 120 lb (54.4 kg)         BP Readings from Last 3 Encounters:   08/04/21 124/76   05/26/21 98/64   10/08/20 116/64       Hematology and Coagulation  Lab Results   Component Value Date    WBC 6.4 05/10/2019    RBC 4.65 05/10/2019    HGB 14.0 05/10/2019    HCT 43.6 05/10/2019    MCV 93.8 05/10/2019    MCH 30.1 05/10/2019    MCHC 32.1 05/10/2019    RDW 13.6 05/10/2019    PLT See Reflexed IPF Result 05/10/2019    MPV NOT REPORTED 05/10/2019     No results found for: ESR    Chemistries  Lab Results   Component Value Date     05/10/2019    K 5.1 05/10/2019    CL 97 05/10/2019    CO2 25 05/10/2019    BUN 11 05/10/2019    CREATININE 0.60 05/10/2019    CALCIUM 9.8 05/10/2019    PROT 6.6 05/10/2019    LABALBU 4.5 05/10/2019    BILITOT 0.71 05/10/2019    ALKPHOS 86 05/10/2019    AST 25 05/10/2019    ALT 13 05/10/2019     Lab Results   Component Value Date    ALKPHOS 86 05/10/2019    ALT 13 05/10/2019    AST 25 05/10/2019    PROT 6.6 05/10/2019    BILITOT 0.71 05/10/2019    LABALBU 4.5 05/10/2019     Lab Results   Component Value Date    BUN 11 05/10/2019    CREATININE 0.60 05/10/2019     Lab Results   Component Value Date    CALCIUM 9.8 05/10/2019     Lab Results   Component Value Date    AST 25 05/10/2019    ALT 13 05/10/2019       Review of Systems   Constitutional: Positive for fatigue. Negative for appetite change, chills, diaphoresis, fever and unexpected weight change. HENT: Negative for congestion, dental problem, drooling, ear discharge, ear pain, facial swelling, hearing loss, mouth sores, nosebleeds, postnasal drip, sinus pressure, sore throat, tinnitus, trouble swallowing and voice change. Eyes: Negative for photophobia, pain, discharge, redness, itching and visual disturbance. Respiratory: Negative for apnea, cough, choking, chest tightness, shortness of breath and wheezing. Cardiovascular: Negative for chest pain, palpitations, leg swelling and near-syncope. Gastrointestinal: Negative for abdominal distention, abdominal pain, anorexia, blood in stool, bowel incontinence, change in bowel habit, constipation, diarrhea, nausea and vomiting.    Endocrine: Negative for cold intolerance, heat intolerance, polydipsia, is attentive. Mood and Affect: Mood is anxious and depressed. Affect is blunt. Affect is not labile, angry or inappropriate. Speech: She is communicative. Speech is delayed. Speech is not rapid and pressured or tangential.         Behavior: Behavior is slowed. Behavior is not agitated, aggressive, withdrawn, hyperactive or combative. Behavior is cooperative. Thought Content: Thought content is not paranoid or delusional. Thought content does not include homicidal or suicidal ideation. Thought content does not include homicidal or suicidal plan. Cognition and Memory: Memory is impaired. She exhibits impaired recent memory. She does not exhibit impaired remote memory. Judgment: Judgment is not impulsive or inappropriate. NEUROLOGICALEXAMINATION :       A) MENTAL STATUS:                   Alert and  oriented  To time, place  And  Person. No Aphasia. LOW  VOICE                     Able   To  Follow    SIMPLE       Stepcommands   without   Any  Difficulty. No right  To left confusion. SLOW    Speech  And language function. Insight and  Judgment ,Fund  Of  Knowledge   DECREASED                Recent  And  Remote memory  DECREASED                Attention &  Concentration are   DECREASED                                                    B) CRANIAL NERVES :      CN : Visual  Acuity  And  Visual fields  within normal limits               Fundi  Could  Not  Be  Could  Not  Be  Evaluated. 3,4,6 CN : Both  Pupils are   Reactive and  Equal.  Movements  Are  Intact. No  Nystagmus. No  GILLES. No  Afferent  Pupillary  Defect noted. 5 CN :  Normal  Facial sensations and Corneal  Reflexes           7 CN:  Normal  Facial  Symmetry  And  Strength. No facial  Weakness.            8 CN :  Hearing  Appears   DECREASED          9, 10 CN: Normal   spontaneous, reflex palate   movements         11 CN:   Normal  Shoulder  shrug and  strength         12 CN :   Normal  Tongue movements and  Tongue  In midline                        No tongue   Fasciculations or atrophy       C) MOTOR  EXAM:                 Strength  In upper  And  Lower   extremities     4 + 5    BILATERALLY                      Rapid   alternating  And  repetitions  Movements  DECREASED               Muscle  Tone  In upper  And  Lower  Extremities  normal                No rigidity. No  Spasticity. Bradykinesia   PRESENT                  No  Asterixis. Sustention  Tremor , Resting   Tremor   absent                    No   other  Abnormal  Movements noted           D) SENSORY :               Light   touch, pinprick,   position  And  Vibration   DECREASED  IN  GLOVE  AND  STOCKING  MANNER        E) REFLEXES:                   Deep  Tendon  Reflexes   DECRESED                  No  pathological  Reflexes  Bilaterally.                                   F) COORDINATION  AND  GAIT :                                Station and  Gait  SLOW  UNSTEADY                             Romberg 's test   POSITIVE                            Ataxia negative      ASSESSMENT:    Patient Active Problem List   Diagnosis    Vertigo    H/O falling    Mild closed head injury    Osteoarthritis    Hypothyroidism    Hyperlipidemia    Excessive falling    Esophageal reflux    Dysthymic disorder    Difficulty sleeping    COPD (chronic obstructive pulmonary disease) ([address]    Adult BMI 27.0-27.9 kg/sq m    Abnormal gait    Polyneuropathy associated with underlying disease ([address]    Chronic fatigue    Gait difficulty    Balance problem    Bradykinesia    Memory problem    PD (Parkinson's disease) ([address]    Anxiety and depression    Chronic tension-type headache, not intractable    Carotid stenosis, asymptomatic, unspecified laterality    VBI (vertebrobasilar insufficiency)    Chronic cerebral ischemia    Bilateral carpal tunnel syndrome    Ulnar neuropathy of both upper extremities    Peroneal neuropathy    Myopathy    Chronic bilateral low back pain without sciatica    Dementia associated with other underlying disease without behavioral disturbance [address] [address] [facility]    Dyskinesia           ULTRASOUND EVALUATION OF THE CAROTID ARTERIES       7/3/2019       COMPARISON:   None.       HISTORY:   ORDERING SYSTEM PROVIDED HISTORY: Vertigo   TECHNOLOGIST PROVIDED HISTORY:   dizziness       FINDINGS:       RIGHT:       The right common carotid artery demonstrates peak systolic velocities of 50   and 45 cm/sec in the proximal and distal segments respectively.       The right internal carotid artery demonstrates the systolic velocities of 51,   74, and 93 cm/sec in the proximal, mid and distal segments respectively.       The external carotid artery is patent.  The vertebral artery demonstrates   normal antegrade flow.       Heterogeneous hard plaque at the bifurcation with measured stenosis of up to   53% in the proximal internal carotid artery without corresponding flow   velocity acceleration.       ICA/CCA ratio of 1.0.           LEFT:       The left common carotid artery demonstrates peak systolic velocities of 71   and 46 cm/sec in the proximal and distal segments respectively.       The left internal carotid artery demonstrates the systolic velocities of 39,   53, and 98 cm/sec in the proximal, mid and distal segments respectively.       The external carotid artery is patent.  The vertebral artery demonstrates   normal antegrade flow.       Minimal plaque at the bifurcation.       ICA/CCA ratio of 0.5.           Impression   The right internal carotid artery demonstrates 0-50% stenosis by velocity   criteria.  Plaque at the bifurcation with measured stenosis of up to 53% in   the proximal internal carotid artery without corresponding flow velocity   acceleration.       The left internal carotid artery demonstrates 0-50% stenosis .       Bilateral vertebral arteries are patent with flow in the normal direction.                 VISITING DIAGNOSIS:      ICD-10-CM    1. PD (Parkinson's disease) ([facility] [address]  G20    2. Chronic cerebral ischemia  I67.82    3. Chronic tension-type headache, not intractable  G44.229    4. H/O falling  Z91.81    5. Myopathy  G72.9    6. Mild closed head injury, sequela  S09.90XS    7. Bilateral carpal tunnel syndrome  G56.03    8. Balance problem  R26.89    9. Abnormal gait  R26.9    10. Polyneuropathy associated with underlying disease ([facility] [address]  G63    11. Acquired hypothyroidism  E03.9    12. Ulnar neuropathy of both upper extremities  G56.23    13. Carotid stenosis, asymptomatic, unspecified laterality  I65.29    14. Difficulty sleeping  G47.9    15. Anxiety and depression  F41.9     F32.9    16. Dysthymic disorder  F34.1    17. Dizziness  R42    18. Bradykinesia  R25.8    19. Memory problem  R41.3    20. Gait difficulty  R26.9    21. VBI (vertebrobasilar insufficiency)  G45.0    22. Chronic bilateral low back pain without sciatica  M54.5     G89.29    23. Dementia associated with other underlying disease without behavioral disturbance ([facility] [address]  F02.80    24. Dyskinesia  G24.9               CONCERNS   &   INCREASED   RISK   FOR         * TIA,  CEREBRO  VASCULAR  ISCHEMIA,STROKE     *   DIZZINESS,   VERTEBROBASILAR  INSUFFICIENCY ,       *   ORTHOSTATIC  INTOLERANCE,    AUTONOMIC  NEUROPATHY       *     CHRONIC  TENSION  HEADACHES      *   COGNITIVE  &   MEMORY PROBLEMS  AND  DEMENTIAS       *   PERIPHERAL  NEUROPATHY,       *  PARKINSON'S  DISEASE        *  GAIT  DIFFICULTIES  &   BALANCE PROBLMES   AND  FALL                VARIOUS  RISK   FACTORS   WERE  REVIEWED   AND   DISCUSSED. *  PATIENT   HAS  MULTIPLE   MEDICAL, NEUROLOGICAL                        AND   MENTAL HEALTH   PROBLEMS . PATIENT'S   MANAGEMENT  IS  CHALLENGING.             PLAN:                     Una Marquez  Of  The Diagnoses,  The  Management & Treatment  Options            AND    Care  plan  Were          Reviewed and   Discussed   With  patient. * Goals  And  Expectations  Of  The  Therapy  Discussed   And  Reviewed. *   Benefits   And   Side  Effect  Profile  Of  Medication/s   Were   Discussed                * Need   For  Further   Follow up For  The  Various  Problems Were  discussed. * Results  Of  The  Previous  Diagnostic tests were reviewed and  discussed                 Medical  Decision  Making  Was  Made  Based on the   Complexity  Of  Above  Mentioned  Diagnoses,    Data reviewed             And    Risk  Of  Significant   Co morbidities and   complicating   Factors. Medical  Decision  Was   High    Complexity   Due   To  The  Patient's  Multiple  Symptoms,            Advancing   Disease,  Complex  Treatment  Regimen,  Multiple medications           and   Risk  Of   Side  Effects,  Difficulty  In  Medication  Management  And  Diagnostic  Challenges           In  View  Of  The  Associated   Co  Morbid  Conditions   And  Problems. * FALL   PRECAUTIONS. THESE  REVIEWED   AND  DISCUSSED    *  USE   WALKING  ASSISTANCE  DEVICES     QUAD  CANE  /   WALKER          *    SUPERVISED   CARE    *   ABSOLUTELY   NO  DRIVING      *   BE  CAREFUL  WITH  ACTIVITIES            *   AVOID   NECK  AND/ BACK  STRAINING  ACTIVITIES          *   ADEQUATE   FLUIDINTAKE   AND  ELECTROLYTE  BALANCE             * KEEP  DAIRY  OF   THE  NEUROLOGICAL  SYMPTOMS        RECORDING THE    DURATION  AND  FREQUENCY. *  AVOID    CONDITIONS  AND  FACTORS   THAT  MAKE                  NEUROLOGICAL  SYMPTOMS  WORSE.                          *TO  MAINTAIN  REGULAR  SLEEP  WAKE  CYCLES. *   TO  HAVE  ADEQUATE  REST  AND   SLEEP    HOURS. *      WEIGHT   LOSS.              *    AVOID  ANY USAGE OF    TOBACCO,              EXCESSIVE  ALCOHOL  AND   ILLEGAL SUBSTANCES              *  CONTINUE   MEDICATIONS    PRESCRIBED   BY NEUROLOGIST                           AS    RECOMMENDED         *   Compliance   With  Medications   And  Instructions            *    MIGRAINE/ HEADACHE    DAIRY   WITH  MONITORING                       OF  DURATION  AND  FREQUENCY.           *  May   Use  Pill  Box,    If  Needed          *    Antiplatelet  therapy    As   Recommended  Was   Discussed          *    Prophylactic  Use   Of     Vitamin   B   Complex,  Folic  Acid,    Vitamin  B12    Multivitamin,       Calcium  With  magnesium  And  Vit D    Supplementations   Over  The  Counter  Discussed               *  PATIENT  IS  ALSO   COUNSELED   TO  KEEP    ACTIVITIES:           A)   SIMPLE      B)  ORGANIZED      C)  WRITEDOWN                       *  EVALUATIONS  AND  FOLLOW UP:                  * PHYSICAL  THERAPY               *    OCCUPATIONAL THERAPY   / SPEECH  THERAPY                               *CARDIOLOGY                                                 *          PATIENT     RECOMMENDED   SUPERVISED      CARE,                                     PERIODIC     THERAPY       AND     ASSISTED  LIVING                                 -    DISCUSSED   WITH  PATIENT  AND  HER  DAUGHTER                                                      *         H/O     ABNORMAL  MOVEMENTS  OF    TONGUE                                HUMMING,      RUBBING   HANDS    INTERMITTENTLY    SINCE    July 2021                                                       -    DISKINESIA                                    NO     H/O    RECENT    MENTAL  HEALTH    MEDICATIONS                                  PATIENT  ADVISED      TO   STOP   SINEMET     TID                                         TO  CONTINUE   SINEMET    CR   BID     AS   BEFORE                             -    DISCUSSED    WITH   PATIENT'S   DAUGHTER  IN   DETAIL                                *PATIENT   TO  FOLLOW  UP  WITH   PRIMARY  CARE OTHER  CONSULTANTS  AS  BEFORE.               *TO  FOLLOW  WITH   MENTAL  HEALTH  PROFESSIONALS ,  INCLUDING            PSYCHOLOGICAL  COUNSELING   AND  PSYCHIATRIC  EVALUTIONS,                     *  Maintain   Healthy  Life Style    With   Periodic  Monitoring  Of      Any  Medical  Conditions  Including   Elevated  Blood  Pressure,  Lipid  Profile,     Blood  Sugar levels  AndHeart  Disease. *   Period   Screening  For  Cancers  Involving  Breast,  Colon,    lungs  And  Other  Organs  As  Applicable,  In consultation   With  Your  Primary Care Providers. *Second  Neurological  Opinion  And  Evaluations  In  [facility]  Setting  If  Patient  Is  Interested. * Please   Contact   Neurology  Clinic   Early   If   Are  Any  New  Neurological   And  Any neurological  Concerns. *  If  The  Patient remains  Neurologically  Stable   Return   To  [facility] Neurology Department   IN    3 - 6     MONTHS  TIME   FOR  FURTHER              FOLLOW UP.                       *   The  Neurological   Findings,  Possible  Diagnosis,  Differential diagnoses                    And  Options  For    Further   Investigations                   And  management   Are  Discussed  Comprehensively. Medications   And  Prescription   Risks  And  Side effects  Are   Also  Discussed. *  If   There is  Any  Significant  Worsening   Of  Current  Symptoms  And  Or                  If patient  Develops   Any additional  New  NeurologicalSymptoms                  Or  Significant  Concerns   Should  Call  911 or  Go  To  Emergency  Department  For  Further  Immediate  Evaluation. The   Above  Were  Reviewed  With  Patient   AND  HER  FAMILY         and                       questions  Answered  In  Detail.                       More   Than  50% of face  To face Time   Was  Spent  On  Counseling is not hard to reach this goal. Aserving or helping is 1 piece of fruit, 1 cup of vegetables, or 2 cups of leafy, raw vegetables. Have an apple or some carrot sticks as an afternoon snack instead of a candy bar. Try to have fruits and/or vegetables at everymeal.   Make exercise part of your daily routine. You may want to start with simple activities, such as walking, bicycling, or slow swimming. Try mely active 30 to 60 minutes every day. You do not need to do all 30 to 60 minutes all at once. For example, you can exercise 3 times a day for 10 or 20 minutes. Moderate exercise is safe for most people, but it is always agood idea to talk to your doctor before starting an exercise program.   Keep moving. Ebb Gavino the lawn, work in the garden, or [employer]. Take the stairs instead of the elevator at work.  If you smoke, quit. Peoplewho smoke have an increased risk for heart attack, stroke, cancer, and other lung illnesses. Quitting is hard, but there are ways to boost your chance of quitting tobacco for good.  Use nicotine gum, patches, or lozenges.  Ask your doctor about stop-smoking programs and medicines.  Keep trying.  In addition to reducing your risk of diseases in the future, you will notice some benefits soon after you stop using tobacco. If you have shortness of breath or asthma symptoms, they will likely getbetter within a few weeks after you quit.  Limit how much alcohol you drink. Moderate amounts of alcohol (up to 2 drinks a day for men, 1drink a day for women) are okay. But drinking too much can lead to liver problems, high blood pressure, and other health problems.  health   If you have a family, there are many things you can do together to improve your health.  Eat meals together as a family as often as possible.  Eat healthy foods. This includes fruits, vegetables, lean meats and dairy, and whole grains.  Include your family in your fitness plan.  Most peoplethink of activities such as jogging or tennis as the way to fitness, but there are many ways you and your family can be more active. Anything that makes you breathe hard and gets your heart pumping is exercise. Here are sometips:   Walk to do errands or to take your child to school or the bus.  Go for a family bike ride after dinner instead of watching TV.  Where can you learn more?  Go toL[employer] [email or web address]. org and sign in to your [employer] account. Enter P779 in the Search HealthInformation box to learn more about \"A Healthy Lifestyle: Care Instructions. \"     If you do not have anaccount, please click on the \"Sign Up Now\" link.  Current as of: July 26, 2016   Content Version: 11.2   © 4603-0057 [employer]. Care instructions adapted under license by [facility] ([facility]). If you have questions about a medical condition or this instruction, always ask your healthcare professional. [employer] disclaims any warranty or liability for your use of this information.

## 2021-08-04 NOTE — PATIENT INSTRUCTIONS
* FALL   PRECAUTIONS. *  USE   WALKING  ASSISTANCE  DEVICES    /   WALKER      *   SUPERVISED   CARE      *   ADEQUATE   FLUID  INTAKE   AND  ELECTROLYTE  BALANCE           * KEEP  DAIRY  OF   THE  NEUROLOGICAL  SYMPTOMS          *  TO  MAINTAIN  REGULAR  SLEEP  WAKE  CYCLES. *   TO  HAVE  ADEQUATE  REST  AND   SLEEP    HOURS.        *    AVOID  USAGE OF   TOBACCO,  EXCESSIVE  ALCOHOL                AND   ILLEGAL   SUBSTANCES,  IF  ANY          *  Maintain   Healthy  Life Style    With   Periodic  Monitoring  Of      Any  Medical  Conditions  Including   Elevated  Blood  Pressure,  Lipid  Profile,     Blood  Sugar levels  And   Heart  Disease. *   Period   Screening  For  Cancers  Involving  Breast,  Colon,   lungs  And  Other  Organs  As  Applicable,  In consultation   With  Your  Primary Care Providers. *  If   There is  Any  Significant  Worsening   Of  Current  Symptoms  And  Or  If    Any additional  New  Neurological  Symptoms                 Or  Significant  Concerns   Should  Call  911 or  Go  To  Emergency  Department  For  Further  Immediate  Evaluation.

## 2021-08-18 ENCOUNTER — TELEPHONE (OUTPATIENT)
Dept: NEUROLOGY | Age: 76
End: 2021-08-18

## 2021-08-18 NOTE — TELEPHONE ENCOUNTER
This writer was contacted by both patient's daughter and home health nurse in re: Sinemet order clarifications - medication list still has patient currently taking both Sinemet BID and TID (different dosages) - but note states otherwise -    PATIENT  ADVISED      TO   STOP   SINEMET     TID                                          TO  CONTINUE   SINEMET    CR   BID     AS   BEFORE      Home health nurse states that patient's daughter thought it was the other way around. Home health needs a clear order stating what to take and what to stop.     Please advise, thank you    Last Appt:  8/4/2021  Next Appt:   11/10/2021      Fax number: [phone] ([facility])

## 2021-08-30 ENCOUNTER — TELEPHONE (OUTPATIENT)
Dept: ONCOLOGY | Age: 76
End: 2021-08-30

## 2021-12-08 RX ORDER — MULTIVITAMIN WITH FOLIC ACID 400 MCG
TABLET ORAL
Qty: 30 TABLET | Refills: 5 | Status: SHIPPED | OUTPATIENT
Start: 2021-12-08 | End: 2022-07-12

## 2022-07-12 RX ORDER — MULTIVITAMIN WITH FOLIC ACID 400 MCG
TABLET ORAL
Qty: 30 TABLET | Refills: 5 | Status: SHIPPED | OUTPATIENT
Start: 2022-07-12

## 2023-01-27 DIAGNOSIS — G45.0 VBI (VERTEBROBASILAR INSUFFICIENCY): Primary | ICD-10-CM

## 2023-01-27 RX ORDER — MULTIVITAMIN WITH FOLIC ACID 400 MCG
TABLET ORAL
Qty: 30 TABLET | Refills: 5 | OUTPATIENT
Start: 2023-01-27

## 2023-03-07 RX ORDER — MULTIVITAMIN WITH FOLIC ACID 400 MCG
TABLET ORAL
Qty: 30 TABLET | Refills: 5 | OUTPATIENT
Start: 2023-03-07

## 2023-08-29 NOTE — TELEPHONE ENCOUNTER
Alysa called saying they needed an Tcd order signed for her as she is coming in at 8 on 08/09/19.
There is a TCD order in chart that was made on 6-. Please review chart.  Thanks
BIGG